# Patient Record
Sex: FEMALE | Race: WHITE | NOT HISPANIC OR LATINO | Employment: UNEMPLOYED | URBAN - METROPOLITAN AREA
[De-identification: names, ages, dates, MRNs, and addresses within clinical notes are randomized per-mention and may not be internally consistent; named-entity substitution may affect disease eponyms.]

---

## 2022-05-13 ENCOUNTER — APPOINTMENT (OUTPATIENT)
Dept: CT IMAGING | Facility: HOSPITAL | Age: 57
End: 2022-05-13

## 2022-05-13 ENCOUNTER — HOSPITAL ENCOUNTER (INPATIENT)
Facility: HOSPITAL | Age: 57
LOS: 4 days | Discharge: LEFT BEFORE DISCHARGE | End: 2022-05-17
Attending: EMERGENCY MEDICINE | Admitting: INTERNAL MEDICINE

## 2022-05-13 DIAGNOSIS — R17 JAUNDICE: Primary | ICD-10-CM

## 2022-05-13 DIAGNOSIS — E80.6 HYPERBILIRUBINEMIA: ICD-10-CM

## 2022-05-13 DIAGNOSIS — K75.9 HEPATITIS: ICD-10-CM

## 2022-05-13 DIAGNOSIS — R10.11 RIGHT UPPER QUADRANT PAIN: ICD-10-CM

## 2022-05-13 DIAGNOSIS — R74.8 ELEVATED LIVER ENZYMES: ICD-10-CM

## 2022-05-13 PROBLEM — Z72.0 TOBACCO ABUSE: Status: ACTIVE | Noted: 2022-05-13

## 2022-05-13 PROBLEM — F15.10 AMPHETAMINE ABUSE: Status: ACTIVE | Noted: 2022-05-13

## 2022-05-13 LAB
ALBUMIN SERPL-MCNC: 3.4 G/DL (ref 3.5–5.2)
ALBUMIN/GLOB SERPL: 1 G/DL
ALP SERPL-CCNC: 289 U/L (ref 39–117)
ALT SERPL W P-5'-P-CCNC: 1922 U/L (ref 1–33)
AMMONIA BLD-SCNC: 18 UMOL/L (ref 11–51)
AMPHET+METHAMPHET UR QL: POSITIVE
AMYLASE SERPL-CCNC: 87 U/L (ref 28–100)
ANION GAP SERPL CALCULATED.3IONS-SCNC: 9 MMOL/L (ref 5–15)
APAP SERPL-MCNC: <5 MCG/ML (ref 0–30)
APTT PPP: 36.4 SECONDS (ref 61–76.5)
AST SERPL-CCNC: 1901 U/L (ref 1–32)
BACTERIA UR QL AUTO: ABNORMAL /HPF
BARBITURATES UR QL SCN: NEGATIVE
BASOPHILS # BLD AUTO: 0 10*3/MM3 (ref 0–0.2)
BASOPHILS NFR BLD AUTO: 0.4 % (ref 0–1.5)
BENZODIAZ UR QL SCN: NEGATIVE
BILIRUB CONJ SERPL-MCNC: >10 MG/DL (ref 0–0.3)
BILIRUB SERPL-MCNC: 14.6 MG/DL (ref 0–1.2)
BILIRUB UR QL STRIP: ABNORMAL
BUN SERPL-MCNC: 10 MG/DL (ref 6–20)
BUN/CREAT SERPL: 16.4 (ref 7–25)
CALCIUM SPEC-SCNC: 9 MG/DL (ref 8.6–10.5)
CANNABINOIDS SERPL QL: NEGATIVE
CHLORIDE SERPL-SCNC: 102 MMOL/L (ref 98–107)
CLARITY UR: CLEAR
CO2 SERPL-SCNC: 26 MMOL/L (ref 22–29)
COCAINE UR QL: NEGATIVE
COLOR UR: ABNORMAL
CREAT SERPL-MCNC: 0.61 MG/DL (ref 0.57–1)
DEPRECATED RDW RBC AUTO: 53.4 FL (ref 37–54)
EGFRCR SERPLBLD CKD-EPI 2021: 103.8 ML/MIN/1.73
EOSINOPHIL # BLD AUTO: 0.1 10*3/MM3 (ref 0–0.4)
EOSINOPHIL NFR BLD AUTO: 2.4 % (ref 0.3–6.2)
ERYTHROCYTE [DISTWIDTH] IN BLOOD BY AUTOMATED COUNT: 17 % (ref 12.3–15.4)
ETHANOL UR QL: <0.01 %
GLOBULIN UR ELPH-MCNC: 3.5 GM/DL
GLUCOSE SERPL-MCNC: 99 MG/DL (ref 65–99)
GLUCOSE UR STRIP-MCNC: NEGATIVE MG/DL
HCT VFR BLD AUTO: 38.7 % (ref 34–46.6)
HETEROPH AB SER QL LA: NEGATIVE
HGB BLD-MCNC: 12.8 G/DL (ref 12–15.9)
HGB UR QL STRIP.AUTO: ABNORMAL
HYALINE CASTS UR QL AUTO: ABNORMAL /LPF
INR PPP: 1.48 (ref 0.93–1.1)
KETONES UR QL STRIP: NEGATIVE
LEUKOCYTE ESTERASE UR QL STRIP.AUTO: NEGATIVE
LIPASE SERPL-CCNC: 29 U/L (ref 13–60)
LYMPHOCYTES # BLD AUTO: 1.6 10*3/MM3 (ref 0.7–3.1)
LYMPHOCYTES NFR BLD AUTO: 26.7 % (ref 19.6–45.3)
MAGNESIUM SERPL-MCNC: 1.7 MG/DL (ref 1.6–2.6)
MCH RBC QN AUTO: 29.4 PG (ref 26.6–33)
MCHC RBC AUTO-ENTMCNC: 33 G/DL (ref 31.5–35.7)
MCV RBC AUTO: 89.2 FL (ref 79–97)
METHADONE UR QL SCN: NEGATIVE
MONOCYTES # BLD AUTO: 1 10*3/MM3 (ref 0.1–0.9)
MONOCYTES NFR BLD AUTO: 16.7 % (ref 5–12)
NEUTROPHILS NFR BLD AUTO: 3.2 10*3/MM3 (ref 1.7–7)
NEUTROPHILS NFR BLD AUTO: 53.8 % (ref 42.7–76)
NITRITE UR QL STRIP: NEGATIVE
NRBC BLD AUTO-RTO: 0.2 /100 WBC (ref 0–0.2)
OPIATES UR QL: NEGATIVE
OXYCODONE UR QL SCN: NEGATIVE
PH UR STRIP.AUTO: 6 [PH] (ref 5–8)
PLATELET # BLD AUTO: 308 10*3/MM3 (ref 140–450)
PMV BLD AUTO: 9.7 FL (ref 6–12)
POTASSIUM SERPL-SCNC: 4.1 MMOL/L (ref 3.5–5.2)
PROT SERPL-MCNC: 6.9 G/DL (ref 6–8.5)
PROT UR QL STRIP: NEGATIVE
PROTHROMBIN TIME: 14.9 SECONDS (ref 9.6–11.7)
RBC # BLD AUTO: 4.33 10*6/MM3 (ref 3.77–5.28)
RBC # UR STRIP: ABNORMAL /HPF
REF LAB TEST METHOD: ABNORMAL
SARS-COV-2 RNA PNL SPEC NAA+PROBE: NOT DETECTED
SODIUM SERPL-SCNC: 137 MMOL/L (ref 136–145)
SP GR UR STRIP: 1.01 (ref 1–1.03)
SQUAMOUS #/AREA URNS HPF: ABNORMAL /HPF
UROBILINOGEN UR QL STRIP: ABNORMAL
WBC # UR STRIP: ABNORMAL /HPF
WBC NRBC COR # BLD: 5.9 10*3/MM3 (ref 3.4–10.8)

## 2022-05-13 PROCEDURE — 80307 DRUG TEST PRSMV CHEM ANLYZR: CPT

## 2022-05-13 PROCEDURE — 85025 COMPLETE CBC W/AUTO DIFF WBC: CPT

## 2022-05-13 PROCEDURE — 82140 ASSAY OF AMMONIA: CPT

## 2022-05-13 PROCEDURE — 87635 SARS-COV-2 COVID-19 AMP PRB: CPT | Performed by: EMERGENCY MEDICINE

## 2022-05-13 PROCEDURE — 25010000002 ENOXAPARIN PER 10 MG: Performed by: INTERNAL MEDICINE

## 2022-05-13 PROCEDURE — 82077 ASSAY SPEC XCP UR&BREATH IA: CPT

## 2022-05-13 PROCEDURE — 25010000002 HYDROMORPHONE 1 MG/ML SOLUTION: Performed by: INTERNAL MEDICINE

## 2022-05-13 PROCEDURE — 80143 DRUG ASSAY ACETAMINOPHEN: CPT

## 2022-05-13 PROCEDURE — 85730 THROMBOPLASTIN TIME PARTIAL: CPT

## 2022-05-13 PROCEDURE — 99284 EMERGENCY DEPT VISIT MOD MDM: CPT

## 2022-05-13 PROCEDURE — 0 IOPAMIDOL PER 1 ML: Performed by: EMERGENCY MEDICINE

## 2022-05-13 PROCEDURE — 82150 ASSAY OF AMYLASE: CPT

## 2022-05-13 PROCEDURE — 83735 ASSAY OF MAGNESIUM: CPT

## 2022-05-13 PROCEDURE — 82248 BILIRUBIN DIRECT: CPT

## 2022-05-13 PROCEDURE — 81001 URINALYSIS AUTO W/SCOPE: CPT

## 2022-05-13 PROCEDURE — 85610 PROTHROMBIN TIME: CPT

## 2022-05-13 PROCEDURE — 99222 1ST HOSP IP/OBS MODERATE 55: CPT | Performed by: INTERNAL MEDICINE

## 2022-05-13 PROCEDURE — 74177 CT ABD & PELVIS W/CONTRAST: CPT

## 2022-05-13 PROCEDURE — 25010000002 MAGNESIUM SULFATE 2 GM/50ML SOLUTION: Performed by: INTERNAL MEDICINE

## 2022-05-13 PROCEDURE — 80053 COMPREHEN METABOLIC PANEL: CPT

## 2022-05-13 PROCEDURE — 86308 HETEROPHILE ANTIBODY SCREEN: CPT | Performed by: INTERNAL MEDICINE

## 2022-05-13 PROCEDURE — 83690 ASSAY OF LIPASE: CPT

## 2022-05-13 RX ORDER — SODIUM CHLORIDE 0.9 % (FLUSH) 0.9 %
10 SYRINGE (ML) INJECTION AS NEEDED
Status: DISCONTINUED | OUTPATIENT
Start: 2022-05-13 | End: 2022-05-17 | Stop reason: HOSPADM

## 2022-05-13 RX ORDER — NICOTINE 21 MG/24HR
1 PATCH, TRANSDERMAL 24 HOURS TRANSDERMAL EVERY 24 HOURS
Status: DISCONTINUED | OUTPATIENT
Start: 2022-05-13 | End: 2022-05-17 | Stop reason: HOSPADM

## 2022-05-13 RX ORDER — CHOLECALCIFEROL (VITAMIN D3) 125 MCG
5 CAPSULE ORAL NIGHTLY PRN
Status: DISCONTINUED | OUTPATIENT
Start: 2022-05-13 | End: 2022-05-17 | Stop reason: HOSPADM

## 2022-05-13 RX ORDER — MAGNESIUM SULFATE HEPTAHYDRATE 40 MG/ML
2 INJECTION, SOLUTION INTRAVENOUS ONCE
Status: COMPLETED | OUTPATIENT
Start: 2022-05-13 | End: 2022-05-13

## 2022-05-13 RX ORDER — ALUMINA, MAGNESIA, AND SIMETHICONE 2400; 2400; 240 MG/30ML; MG/30ML; MG/30ML
15 SUSPENSION ORAL EVERY 6 HOURS PRN
Status: DISCONTINUED | OUTPATIENT
Start: 2022-05-13 | End: 2022-05-17 | Stop reason: HOSPADM

## 2022-05-13 RX ORDER — SODIUM CHLORIDE 0.9 % (FLUSH) 0.9 %
10 SYRINGE (ML) INJECTION EVERY 12 HOURS SCHEDULED
Status: DISCONTINUED | OUTPATIENT
Start: 2022-05-13 | End: 2022-05-17 | Stop reason: HOSPADM

## 2022-05-13 RX ORDER — ACETAMINOPHEN 500 MG
500 TABLET ORAL EVERY 6 HOURS PRN
Status: DISCONTINUED | OUTPATIENT
Start: 2022-05-13 | End: 2022-05-17 | Stop reason: HOSPADM

## 2022-05-13 RX ORDER — ONDANSETRON 2 MG/ML
4 INJECTION INTRAMUSCULAR; INTRAVENOUS EVERY 6 HOURS PRN
Status: DISCONTINUED | OUTPATIENT
Start: 2022-05-13 | End: 2022-05-17 | Stop reason: HOSPADM

## 2022-05-13 RX ORDER — TRAZODONE HYDROCHLORIDE 50 MG/1
50 TABLET ORAL NIGHTLY
Status: DISCONTINUED | OUTPATIENT
Start: 2022-05-13 | End: 2022-05-17 | Stop reason: HOSPADM

## 2022-05-13 RX ORDER — ACETAMINOPHEN 160 MG/5ML
500 SOLUTION ORAL EVERY 6 HOURS PRN
Status: DISCONTINUED | OUTPATIENT
Start: 2022-05-13 | End: 2022-05-17 | Stop reason: HOSPADM

## 2022-05-13 RX ORDER — SODIUM CHLORIDE, SODIUM LACTATE, POTASSIUM CHLORIDE, CALCIUM CHLORIDE 600; 310; 30; 20 MG/100ML; MG/100ML; MG/100ML; MG/100ML
100 INJECTION, SOLUTION INTRAVENOUS CONTINUOUS
Status: DISCONTINUED | OUTPATIENT
Start: 2022-05-13 | End: 2022-05-17 | Stop reason: HOSPADM

## 2022-05-13 RX ORDER — PANTOPRAZOLE SODIUM 40 MG/10ML
40 INJECTION, POWDER, LYOPHILIZED, FOR SOLUTION INTRAVENOUS
Status: DISCONTINUED | OUTPATIENT
Start: 2022-05-14 | End: 2022-05-15

## 2022-05-13 RX ORDER — IPRATROPIUM BROMIDE AND ALBUTEROL SULFATE 2.5; .5 MG/3ML; MG/3ML
3 SOLUTION RESPIRATORY (INHALATION) EVERY 6 HOURS PRN
Status: DISCONTINUED | OUTPATIENT
Start: 2022-05-13 | End: 2022-05-17 | Stop reason: HOSPADM

## 2022-05-13 RX ORDER — NITROGLYCERIN 0.4 MG/1
0.4 TABLET SUBLINGUAL
Status: DISCONTINUED | OUTPATIENT
Start: 2022-05-13 | End: 2022-05-17 | Stop reason: HOSPADM

## 2022-05-13 RX ORDER — ACETAMINOPHEN 650 MG/1
325 SUPPOSITORY RECTAL EVERY 6 HOURS PRN
Status: DISCONTINUED | OUTPATIENT
Start: 2022-05-13 | End: 2022-05-17 | Stop reason: HOSPADM

## 2022-05-13 RX ORDER — ONDANSETRON 4 MG/1
4 TABLET, FILM COATED ORAL EVERY 6 HOURS PRN
Status: DISCONTINUED | OUTPATIENT
Start: 2022-05-13 | End: 2022-05-17 | Stop reason: HOSPADM

## 2022-05-13 RX ORDER — ENOXAPARIN SODIUM 100 MG/ML
40 INJECTION SUBCUTANEOUS
Status: DISCONTINUED | OUTPATIENT
Start: 2022-05-13 | End: 2022-05-14

## 2022-05-13 RX ADMIN — SODIUM CHLORIDE, POTASSIUM CHLORIDE, SODIUM LACTATE AND CALCIUM CHLORIDE 100 ML/HR: 600; 310; 30; 20 INJECTION, SOLUTION INTRAVENOUS at 23:52

## 2022-05-13 RX ADMIN — HYDROMORPHONE HYDROCHLORIDE 0.5 MG: 1 INJECTION, SOLUTION INTRAMUSCULAR; INTRAVENOUS; SUBCUTANEOUS at 22:45

## 2022-05-13 RX ADMIN — Medication 10 ML: at 15:37

## 2022-05-13 RX ADMIN — MAGNESIUM SULFATE HEPTAHYDRATE 2 G: 2 INJECTION, SOLUTION INTRAVENOUS at 22:34

## 2022-05-13 RX ADMIN — TRAZODONE HYDROCHLORIDE 50 MG: 50 TABLET ORAL at 22:44

## 2022-05-13 RX ADMIN — ENOXAPARIN SODIUM 40 MG: 100 INJECTION SUBCUTANEOUS at 22:45

## 2022-05-13 RX ADMIN — SODIUM CHLORIDE 1000 ML: 9 INJECTION, SOLUTION INTRAVENOUS at 15:36

## 2022-05-13 RX ADMIN — NICOTINE 1 PATCH: 14 PATCH, EXTENDED RELEASE TRANSDERMAL at 22:44

## 2022-05-13 RX ADMIN — IOPAMIDOL 100 ML: 755 INJECTION, SOLUTION INTRAVENOUS at 17:33

## 2022-05-13 NOTE — ED PROVIDER NOTES
Subjective   Chief Complaint: Jaundice      HPI: Patient is a 56-year-old female presents to the ER today for yellowing skin and eyes and intermittent right upper quadrant pain.  She denies that she has a history of alcoholism, does states she may drink 1-2 daiquiri's once a week. she does report she has used meth in the last 3 weeks.  She states that she noticed her symptoms approximately a week ago and they have progressively gotten worse.  She reports an increased anxiety and stress over the last several months as her mother and father passed away recently.  She has had several abdominal surgeries including ruptured appendix when she was 15 that required bowel repair.    PCP: None          Review of Systems   Constitutional: Positive for fatigue. Negative for chills and fever.   HENT: Negative for sore throat and trouble swallowing.         Mouth swelling   Eyes: Negative for photophobia and visual disturbance.   Respiratory: Negative for cough and shortness of breath.    Cardiovascular: Negative for chest pain and palpitations.   Gastrointestinal: Positive for abdominal pain. Negative for blood in stool, constipation, diarrhea, nausea and vomiting.   Endocrine: Negative for polyuria.   Genitourinary: Negative for dysuria, flank pain and hematuria.   Musculoskeletal: Negative.    Skin: Positive for color change.   Neurological: Negative for dizziness, light-headedness and headaches.   Hematological: Negative.    Psychiatric/Behavioral: Negative.        Past Medical History:   Diagnosis Date   • Anemia    • Arthritis    • Asthma    • Depression    • History of transfusion    • Kidney stone    • Pneumonia        No Known Allergies    Past Surgical History:   Procedure Laterality Date   • ABDOMINAL SURGERY N/A     Appendectomy, bilateral salpingectomy, bowel resection   • APPENDECTOMY         History reviewed. No pertinent family history.    Social History     Socioeconomic History   • Marital status: Single    Tobacco Use   • Smoking status: Current Every Day Smoker     Packs/day: 1.00     Years: 40.00     Pack years: 40.00     Types: Cigarettes   Vaping Use   • Vaping Use: Never used   Substance and Sexual Activity   • Alcohol use: Not Currently   • Drug use: Not Currently     Types: IV, Cocaine(coke), Marijuana, Methamphetamines     Comment: Marijuana use last 3 weeks ago   • Sexual activity: Yes     Partners: Male     Birth control/protection: None           Objective   Physical Exam  Vitals reviewed.   Constitutional:       Appearance: She is not ill-appearing or toxic-appearing.   HENT:      Head: Normocephalic.      Mouth/Throat:      Mouth: Mucous membranes are moist.      Pharynx: Oropharynx is clear.   Eyes:      General: Lids are normal.      Extraocular Movements: Extraocular movements intact.      Pupils: Pupils are equal, round, and reactive to light.      Comments: Scleral icterus   Cardiovascular:      Rate and Rhythm: Normal rate and regular rhythm.      Pulses: Normal pulses.      Heart sounds: Normal heart sounds. No murmur heard.  Pulmonary:      Effort: Pulmonary effort is normal.      Breath sounds: Normal breath sounds. No wheezing.   Abdominal:      General: Bowel sounds are normal. There is no distension.      Palpations: Abdomen is soft. There is hepatomegaly.      Tenderness: There is no abdominal tenderness.   Musculoskeletal:         General: No tenderness. Normal range of motion.      Cervical back: Normal range of motion.   Skin:     General: Skin is warm and dry.      Coloration: Skin is jaundiced.      Findings: No bruising.   Neurological:      General: No focal deficit present.      Mental Status: She is alert and oriented to person, place, and time. Mental status is at baseline.      Motor: No weakness.   Psychiatric:         Attention and Perception: Attention normal.         Mood and Affect: Mood is anxious.         Speech: Speech normal.         Behavior: Behavior is cooperative.     "     Thought Content: Thought content normal.         Cognition and Memory: Cognition normal.         Judgment: Judgment normal.         Procedures           ED Course  ED Course as of 05/13/22 2021   Fri May 13, 2022   1453 Patient evaluated after being placed in a room from triage []   1631 CT Abdomen Pelvis With Contrast  Pending testing  [BH]   1722 CT Abdomen Pelvis With Contrast  Pending testing.  [BH]   1722 Bilirubin, UA(!): Moderate (2+) [BH]   1723 Bilirubin, Direct(!): >10.0 [BH]   1723 ALT (SGPT)(!): 1,922 [BH]   1723 AST (SGOT)(!): 1,901 [BH]   1723 Alkaline Phosphatase(!): 289 [BH]   1723 Total Bilirubin(!): 14.6 [BH]   1723 Albumin(!): 3.40 [BH]   1737 Amphet/Methamphet, Screen(!): Positive  Patient admitted to meth abuse approximately 3 weeks ago.  []   1740 CT Abdomen Pelvis With Contrast  Pending results []   1832 CT Abdomen Pelvis With Contrast  Pending results   []   1918 Patient reports last intake was approximately 1 hour ago with sips of a drink.  She dorsey not had a meal today.  []   1956 Spoke with Dr. Bautista with gastroenterology who advised that patient would need an TAD as well as a hepatic profile.  He requested that a PT/INR and CMP be completed in the morning as well as IV fluids.  Dr. Ramey was notified. []      ED Course User Index  [] Jenna Harvey, BLAINE           /75 (BP Location: Left arm, Patient Position: Lying)   Pulse 84   Temp 97.8 °F (36.6 °C) (Oral)   Resp 20   Ht 157.5 cm (62\")   Wt 63.1 kg (139 lb 1.8 oz)   SpO2 99%   BMI 25.44 kg/m²   Labs Reviewed   COMPREHENSIVE METABOLIC PANEL - Abnormal; Notable for the following components:       Result Value    Albumin 3.40 (*)     ALT (SGPT) 1,922 (*)     AST (SGOT) 1,901 (*)     Alkaline Phosphatase 289 (*)     Total Bilirubin 14.6 (*)     All other components within normal limits    Narrative:     GFR Normal >60  Chronic Kidney Disease <60  Kidney Failure <15     PROTIME-INR - Abnormal; Notable " for the following components:    Protime 14.9 (*)     INR 1.48 (*)     All other components within normal limits   APTT - Abnormal; Notable for the following components:    PTT 36.4 (*)     All other components within normal limits   URINALYSIS W/ MICROSCOPIC IF INDICATED (NO CULTURE) - Abnormal; Notable for the following components:    Color, UA Dark Yellow (*)     Bilirubin, UA Moderate (2+) (*)     Blood, UA Small (1+) (*)     All other components within normal limits   URINE DRUG SCREEN - Abnormal; Notable for the following components:    Amphet/Methamphet, Screen Positive (*)     All other components within normal limits    Narrative:     Negative Thresholds Per Drugs Screened:    Amphetamines                 500 ng/ml  Barbiturates                 200 ng/ml  Benzodiazepines              100 ng/ml  Cocaine                      300 ng/ml  Methadone                    300 ng/ml  Opiates                      300 ng/ml  Oxycodone                    100 ng/ml  THC                           50 ng/ml    The Normal Value for all drugs tested is negative. This report includes final unconfirmed screening results to be used for medical treatment purposes only. Unconfirmed results must not be used for non-medical purposes such as employment or legal testing. Clinical consideration should be applied to any drug of abuse test, particularly when unconfirmed results are used.          All urine drugs of abuse requests without chain of custody are for medical screening purposes only.  False positives are possible.     CBC WITH AUTO DIFFERENTIAL - Abnormal; Notable for the following components:    RDW 17.0 (*)     Monocyte % 16.7 (*)     Monocytes, Absolute 1.00 (*)     All other components within normal limits   BILIRUBIN, DIRECT - Abnormal; Notable for the following components:    Bilirubin, Direct >10.0 (*)     All other components within normal limits   URINALYSIS, MICROSCOPIC ONLY - Abnormal; Notable for the following  components:    RBC, UA 0-2 (*)     WBC, UA 0-2 (*)     All other components within normal limits   LIPASE - Normal   AMYLASE - Normal   AMMONIA - Normal   ACETAMINOPHEN LEVEL - Normal    Narrative:     Acetaminophen Therapeutic Range  5-20 ug/mL      Hours after ingestion            Toxic Value    4 Hours                           150 ug/mL    8 Hours                            70 ug/mL   12 Hours                            40 ug/mL   16 Hours                            20 ug/mL    These values apply to a single ingestion only.    MAGNESIUM - Normal   COVID PRE-OP / PRE-PROCEDURE SCREENING ORDER (NO ISOLATION)    Narrative:     The following orders were created for panel order COVID PRE-OP / PRE-PROCEDURE SCREENING ORDER (NO ISOLATION) - Swab, Nasopharynx.  Procedure                               Abnormality         Status                     ---------                               -----------         ------                     COVID-19,CEPHEID/GUTIERREZ,CO...[[id number]]                      In process                   Please view results for these tests on the individual orders.   COVID-19,CEPHEID/GUTIERREZ,COR/KARISHMA/PAD/NILA IN-HOUSE,NP SWAB IN TRANSPORT MEDIA 3-4 HR TAT, RT-PCR   ETHANOL    Narrative:     Plasma Ethanol Clinical Symptoms:    ETOH (%)               Clinical Symptom  .01-.05              No apparent influence  .03-.12              Euphoria, Diminished judgment and attention   .09-.25              Impaired comprehension, Muscle incoordination  .18-.30              Confusion, Staggered gait, Slurred speech  .25-.40              Markedly decreased response to stimuli, unable to stand or                        walk, vomitting, sleep or stupor  .35-.50              Comatose, Anesthesia, Subnormal body temperature       TAD   HEPATIC FUNCTION PANEL   HEPATITIS PANEL, ACUTE   HIV-1 AND HIV-2 ANTIBODIES    Narrative:     The following orders were created for panel order HIV-1 & HIV-2 Antibodies.  Procedure                                Abnormality         Status                     ---------                               -----------         ------                     HIV-1 / O / 2 Ag / Antib...[[id number]]                                                   Please view results for these tests on the individual orders.   MONONUCLEOSIS SCREEN   HIV-1/O/2 ANTIGEN/ANTIBODY, 4TH GENERATION   CBC AND DIFFERENTIAL    Narrative:     The following orders were created for panel order CBC & Differential.  Procedure                               Abnormality         Status                     ---------                               -----------         ------                     CBC Auto Differential[[id number]]        Abnormal            Final result                 Please view results for these tests on the individual orders.     Medications   sodium chloride 0.9 % flush 10 mL (10 mL Intravenous Given 5/13/22 3397)   nitroglycerin (NITROSTAT) SL tablet 0.4 mg (has no administration in time range)   sodium chloride 0.9 % flush 10 mL (has no administration in time range)   sodium chloride 0.9 % flush 10 mL (has no administration in time range)   acetaminophen (TYLENOL) tablet 650 mg (has no administration in time range)     Or   acetaminophen (TYLENOL) 160 MG/5ML solution 650 mg (has no administration in time range)     Or   acetaminophen (TYLENOL) suppository 650 mg (has no administration in time range)   aluminum-magnesium hydroxide-simethicone (MAALOX MAX) 400-400-40 MG/5ML suspension 15 mL (has no administration in time range)   ondansetron (ZOFRAN) tablet 4 mg (has no administration in time range)     Or   ondansetron (ZOFRAN) injection 4 mg (has no administration in time range)   melatonin tablet 5 mg (has no administration in time range)   Enoxaparin Sodium (LOVENOX) syringe 40 mg (has no administration in time range)   sodium chloride 0.9 % bolus 1,000 mL (0 mL Intravenous Stopped 5/13/22 1495)   iopamidol (ISOVUE-370) 76 % injection 100  mL (100 mL Intravenous Given 5/13/22 1733)     CT Abdomen Pelvis With Contrast    Result Date: 5/13/2022  1. Cholelithiasis with dilated gallbladder which may relate to cholecystitis, correlate with associated clinical findings. 2. Common bile duct dilated up to 7 mm with suspicion of a small amount of debris in the proximal CBD concerning for choledocholithiasis. This could be confirmed with MRCP. 3. Prominent dilated pelvic venous vessels asymmetric to the left abutting the uterus which are nonspecific but can be seen with pelvic venous congestion. 4. Small left pleural effusion.     Electronically Signed By-Flo Sylvester MD On:5/13/2022 6:36 PM This report was finalized on [id number] by  Flo Sylvester MD.                                          MDM  Number of Diagnoses or Management Options  Elevated liver enzymes  Jaundice  Right upper quadrant pain  Diagnosis management comments: While in the emergency room an IV was established and labs were obtained.  Patient was given an IV fluid bolus.  She was without pain.  Labs were indicative of elevated liver enzymes, ALT 1922, AST 1901, alk phos was 289, total bilirubin was 14.6 and direct bili was greater than 10.  Patient's white count 5.9.  A call was placed to Dr. Pickering with general surgery who advised that patient would need a GI consult for possible MRCP, but advised that she may need a cholecystectomy following.  Spoke with Dr. Bautista with GI who suggested that an TAD and a hepatic profile be added to the patient's labs, these were added and are pending at time of admission..  Spoke with Dr. Ramey with the hospitalist group who agreed to admission, admitting physician Dr. Juárez.      CBC, CMP and PT and INR orders were placed for a.m. draw.    Chart review: No recent visits that are pertinent to today's complaint    Comorbidities: Unknown patient reports she does not have a primary care    Differentials: Cholecystitis, cirrhosis, liver dysfunction,  biliary obstruction not all inclusive of differentials considered    Appropriate PPE worn throughout the care of this patient.           Amount and/or Complexity of Data Reviewed  Clinical lab tests: reviewed  Tests in the radiology section of CPT®: reviewed    Patient Progress  Patient progress: stable      Final diagnoses:   Jaundice   Right upper quadrant pain   Elevated liver enzymes       ED Disposition  ED Disposition     ED Disposition   Decision to Admit    Condition   --    Comment   Level of Care: Med/Surg [1]   Admitting Physician: SKYLAR WHITE [[id number]]   Attending Physician: SKYLAR WHITE [[id number]]               No follow-up provider specified.       Medication List      No changes were made to your prescriptions during this visit.          Jenna Harvey, APRN  05/13/22 2022

## 2022-05-14 ENCOUNTER — APPOINTMENT (OUTPATIENT)
Dept: MRI IMAGING | Facility: HOSPITAL | Age: 57
End: 2022-05-14

## 2022-05-14 ENCOUNTER — INPATIENT HOSPITAL (OUTPATIENT)
Dept: URBAN - METROPOLITAN AREA HOSPITAL 84 | Facility: HOSPITAL | Age: 57
End: 2022-05-14

## 2022-05-14 DIAGNOSIS — R17 UNSPECIFIED JAUNDICE: ICD-10-CM

## 2022-05-14 DIAGNOSIS — R53.83 OTHER FATIGUE: ICD-10-CM

## 2022-05-14 DIAGNOSIS — K75.9 INFLAMMATORY LIVER DISEASE, UNSPECIFIED: ICD-10-CM

## 2022-05-14 DIAGNOSIS — F15.10 OTHER STIMULANT ABUSE, UNCOMPLICATED: ICD-10-CM

## 2022-05-14 DIAGNOSIS — R11.0 NAUSEA: ICD-10-CM

## 2022-05-14 DIAGNOSIS — R10.9 UNSPECIFIED ABDOMINAL PAIN: ICD-10-CM

## 2022-05-14 LAB
ALBUMIN SERPL-MCNC: 3.1 G/DL (ref 3.5–5.2)
ALBUMIN/GLOB SERPL: 0.9 G/DL
ALP SERPL-CCNC: 272 U/L (ref 39–117)
ALT SERPL W P-5'-P-CCNC: 2097 U/L (ref 1–33)
ANION GAP SERPL CALCULATED.3IONS-SCNC: 12 MMOL/L (ref 5–15)
AST SERPL-CCNC: 2095 U/L (ref 1–32)
BASOPHILS # BLD AUTO: 0.1 10*3/MM3 (ref 0–0.2)
BASOPHILS NFR BLD AUTO: 1.1 % (ref 0–1.5)
BILIRUB CONJ SERPL-MCNC: >10 MG/DL (ref 0–0.3)
BILIRUB SERPL-MCNC: 14.3 MG/DL (ref 0–1.2)
BUN SERPL-MCNC: 9 MG/DL (ref 6–20)
BUN/CREAT SERPL: 16.1 (ref 7–25)
CALCIUM SPEC-SCNC: 9.2 MG/DL (ref 8.6–10.5)
CERULOPLASMIN SERPL-MCNC: 35 MG/DL (ref 19–39)
CHLORIDE SERPL-SCNC: 100 MMOL/L (ref 98–107)
CO2 SERPL-SCNC: 23 MMOL/L (ref 22–29)
CREAT SERPL-MCNC: 0.56 MG/DL (ref 0.57–1)
DEPRECATED RDW RBC AUTO: 52.9 FL (ref 37–54)
EGFRCR SERPLBLD CKD-EPI 2021: 105.9 ML/MIN/1.73
EOSINOPHIL # BLD AUTO: 0.1 10*3/MM3 (ref 0–0.4)
EOSINOPHIL NFR BLD AUTO: 1.4 % (ref 0.3–6.2)
ERYTHROCYTE [DISTWIDTH] IN BLOOD BY AUTOMATED COUNT: 16.7 % (ref 12.3–15.4)
GLOBULIN UR ELPH-MCNC: 3.4 GM/DL
GLUCOSE SERPL-MCNC: 84 MG/DL (ref 65–99)
HAV IGM SERPL QL IA: ABNORMAL
HBV CORE IGM SERPL QL IA: REACTIVE
HBV SURFACE AG SERPL QL IA: ABNORMAL
HCT VFR BLD AUTO: 35.4 % (ref 34–46.6)
HCV AB SER DONR QL: REACTIVE
HGB BLD-MCNC: 11.8 G/DL (ref 12–15.9)
HIV1+2 AB SER QL: NORMAL
INR PPP: 1.51 (ref 0.93–1.1)
LYMPHOCYTES # BLD AUTO: 1.6 10*3/MM3 (ref 0.7–3.1)
LYMPHOCYTES NFR BLD AUTO: 26.6 % (ref 19.6–45.3)
MAGNESIUM SERPL-MCNC: 1.8 MG/DL (ref 1.6–2.6)
MCH RBC QN AUTO: 29.9 PG (ref 26.6–33)
MCHC RBC AUTO-ENTMCNC: 33.3 G/DL (ref 31.5–35.7)
MCV RBC AUTO: 89.9 FL (ref 79–97)
MONOCYTES # BLD AUTO: 0.8 10*3/MM3 (ref 0.1–0.9)
MONOCYTES NFR BLD AUTO: 13.2 % (ref 5–12)
NEUTROPHILS NFR BLD AUTO: 3.5 10*3/MM3 (ref 1.7–7)
NEUTROPHILS NFR BLD AUTO: 57.7 % (ref 42.7–76)
NRBC BLD AUTO-RTO: 0.2 /100 WBC (ref 0–0.2)
PLATELET # BLD AUTO: 272 10*3/MM3 (ref 140–450)
PMV BLD AUTO: 10.3 FL (ref 6–12)
POTASSIUM SERPL-SCNC: 3.9 MMOL/L (ref 3.5–5.2)
PROT SERPL-MCNC: 6.5 G/DL (ref 6–8.5)
PROTHROMBIN TIME: 15.2 SECONDS (ref 9.6–11.7)
RBC # BLD AUTO: 3.94 10*6/MM3 (ref 3.77–5.28)
SODIUM SERPL-SCNC: 135 MMOL/L (ref 136–145)
WBC NRBC COR # BLD: 6.1 10*3/MM3 (ref 3.4–10.8)

## 2022-05-14 PROCEDURE — 86038 ANTINUCLEAR ANTIBODIES: CPT

## 2022-05-14 PROCEDURE — 85610 PROTHROMBIN TIME: CPT

## 2022-05-14 PROCEDURE — 86663 EPSTEIN-BARR ANTIBODY: CPT | Performed by: NURSE PRACTITIONER

## 2022-05-14 PROCEDURE — A9579 GAD-BASE MR CONTRAST NOS,1ML: HCPCS | Performed by: INTERNAL MEDICINE

## 2022-05-14 PROCEDURE — 80074 ACUTE HEPATITIS PANEL: CPT | Performed by: INTERNAL MEDICINE

## 2022-05-14 PROCEDURE — 74183 MRI ABD W/O CNTR FLWD CNTR: CPT

## 2022-05-14 PROCEDURE — 87517 HEPATITIS B DNA QUANT: CPT | Performed by: NURSE PRACTITIONER

## 2022-05-14 PROCEDURE — 25010000002 PIPERACILLIN SOD-TAZOBACTAM PER 1 G: Performed by: NURSE PRACTITIONER

## 2022-05-14 PROCEDURE — 86665 EPSTEIN-BARR CAPSID VCA: CPT | Performed by: NURSE PRACTITIONER

## 2022-05-14 PROCEDURE — 86381 MITOCHONDRIAL ANTIBODY EACH: CPT | Performed by: INTERNAL MEDICINE

## 2022-05-14 PROCEDURE — 86015 ACTIN ANTIBODY EACH: CPT | Performed by: INTERNAL MEDICINE

## 2022-05-14 PROCEDURE — 82787 IGG 1 2 3 OR 4 EACH: CPT | Performed by: INTERNAL MEDICINE

## 2022-05-14 PROCEDURE — 83735 ASSAY OF MAGNESIUM: CPT | Performed by: INTERNAL MEDICINE

## 2022-05-14 PROCEDURE — 99232 SBSQ HOSP IP/OBS MODERATE 35: CPT | Performed by: HOSPITALIST

## 2022-05-14 PROCEDURE — 25010000002 GADOTERIDOL PER 1 ML: Performed by: INTERNAL MEDICINE

## 2022-05-14 PROCEDURE — 99223 1ST HOSP IP/OBS HIGH 75: CPT | Performed by: SURGERY

## 2022-05-14 PROCEDURE — G0432 EIA HIV-1/HIV-2 SCREEN: HCPCS | Performed by: INTERNAL MEDICINE

## 2022-05-14 PROCEDURE — 85025 COMPLETE CBC W/AUTO DIFF WBC: CPT

## 2022-05-14 PROCEDURE — 86645 CMV ANTIBODY IGM: CPT | Performed by: NURSE PRACTITIONER

## 2022-05-14 PROCEDURE — 86664 EPSTEIN-BARR NUCLEAR ANTIGEN: CPT | Performed by: NURSE PRACTITIONER

## 2022-05-14 PROCEDURE — 80053 COMPREHEN METABOLIC PANEL: CPT

## 2022-05-14 PROCEDURE — 87522 HEPATITIS C REVRS TRNSCRPJ: CPT | Performed by: NURSE PRACTITIONER

## 2022-05-14 PROCEDURE — 36415 COLL VENOUS BLD VENIPUNCTURE: CPT | Performed by: INTERNAL MEDICINE

## 2022-05-14 PROCEDURE — 82390 ASSAY OF CERULOPLASMIN: CPT | Performed by: NURSE PRACTITIONER

## 2022-05-14 PROCEDURE — 82248 BILIRUBIN DIRECT: CPT

## 2022-05-14 PROCEDURE — 99223 1ST HOSP IP/OBS HIGH 75: CPT | Performed by: NURSE PRACTITIONER

## 2022-05-14 PROCEDURE — 87341 HEP B SURFACE AG NEUTRLZJ IA: CPT | Performed by: INTERNAL MEDICINE

## 2022-05-14 PROCEDURE — 86644 CMV ANTIBODY: CPT | Performed by: NURSE PRACTITIONER

## 2022-05-14 RX ORDER — METOCLOPRAMIDE HYDROCHLORIDE 5 MG/ML
10 INJECTION INTRAMUSCULAR; INTRAVENOUS EVERY 6 HOURS
Status: DISCONTINUED | OUTPATIENT
Start: 2022-05-14 | End: 2022-05-14

## 2022-05-14 RX ORDER — ZINC SULFATE 50(220)MG
220 CAPSULE ORAL DAILY
Status: DISCONTINUED | OUTPATIENT
Start: 2022-05-14 | End: 2022-05-17 | Stop reason: HOSPADM

## 2022-05-14 RX ORDER — POLYETHYLENE GLYCOL 3350 17 G/17G
17 POWDER, FOR SOLUTION ORAL DAILY
Status: DISCONTINUED | OUTPATIENT
Start: 2022-05-14 | End: 2022-05-14

## 2022-05-14 RX ADMIN — PIPERACILLIN AND TAZOBACTAM 3.38 G: 3; .375 INJECTION, POWDER, LYOPHILIZED, FOR SOLUTION INTRAVENOUS at 10:52

## 2022-05-14 RX ADMIN — Medication 10 ML: at 08:17

## 2022-05-14 RX ADMIN — SODIUM CHLORIDE, POTASSIUM CHLORIDE, SODIUM LACTATE AND CALCIUM CHLORIDE 100 ML/HR: 600; 310; 30; 20 INJECTION, SOLUTION INTRAVENOUS at 11:31

## 2022-05-14 RX ADMIN — GADOTERIDOL 20 ML: 279.3 INJECTION, SOLUTION INTRAVENOUS at 07:42

## 2022-05-14 RX ADMIN — Medication 100 MG: at 16:33

## 2022-05-14 RX ADMIN — PANTOPRAZOLE SODIUM 40 MG: 40 INJECTION, POWDER, FOR SOLUTION INTRAVENOUS at 05:53

## 2022-05-14 RX ADMIN — NICOTINE 1 PATCH: 14 PATCH, EXTENDED RELEASE TRANSDERMAL at 21:53

## 2022-05-14 RX ADMIN — PIPERACILLIN AND TAZOBACTAM 3.38 G: 3; .375 INJECTION, POWDER, LYOPHILIZED, FOR SOLUTION INTRAVENOUS at 16:33

## 2022-05-14 RX ADMIN — TRAZODONE HYDROCHLORIDE 50 MG: 50 TABLET ORAL at 21:51

## 2022-05-14 RX ADMIN — Medication 220 MG: at 16:33

## 2022-05-14 NOTE — PROGRESS NOTES
[facility] Daily Progress Note    Patient Name: Grace Birmingham  : 1965  MRN: [id number]  Primary Care Physician:  Provider, No Known  Date of admission: 2022      Subjective      Chief Complaint: Jaundice      Patient Reports     22: Claims 1 week history of fatigue, jaundice and diarrhea.  NPO.  GI consulted.  Denies excessive alcohol or Tylenol use.    Review of Systems   All other systems reviewed and are negative.         Objective      Vitals:   Temp:  [97.2 °F (36.2 °C)-97.8 °F (36.6 °C)] 97.2 °F (36.2 °C)  Heart Rate:  [70-90] 70  Resp:  [18-20] 18  BP: (116-154)/(72-97) 116/72    Physical Exam  HENT:      Head: Normocephalic.      Nose: Nose normal.   Eyes:      Extraocular Movements: Extraocular movements intact.      Pupils: Pupils are equal, round, and reactive to light.   Cardiovascular:      Rate and Rhythm: Normal rate and regular rhythm.   Pulmonary:      Effort: Pulmonary effort is normal.   Abdominal:      General: Bowel sounds are normal.   Musculoskeletal:         General: Normal range of motion.      Cervical back: Normal range of motion.   Skin:     General: Skin is warm.      Coloration: Skin is jaundiced.   Neurological:      General: No focal deficit present.      Mental Status: She is alert.   Psychiatric:         Mood and Affect: Mood normal.             Result Review    Result Review:  I have personally reviewed the results from the time of this admission to 2022 09:54 EDT and agree with these findings:  [x]  Laboratory  []  Microbiology  [x]  Radiology  []  EKG/Telemetry   []  Cardiology/Vascular   []  Pathology  [x]  Old records  []  Other:            Assessment & Plan      Brief Patient Summary:    56-year-old female with painless jaundice      nicotine, 1 patch, Transdermal, Q24H  pantoprazole, 40 mg, Intravenous, Q AM  piperacillin-tazobactam, 3.375 g, Intravenous, Once  piperacillin-tazobactam, 3.375 g, Intravenous,  Q8H  sodium chloride, 10 mL, Intravenous, Q12H  traZODone, 50 mg, Oral, Nightly       lactated ringers, 100 mL/hr, Last Rate: 100 mL/hr (05/13/22 2505)         Active Hospital Problems:  Active Hospital Problems    Diagnosis    • **Hepatitis    • Hyperbilirubinemia    • Tobacco abuse    • Amphetamine abuse (HCC)      Acute hepatitis:  -s/p CT abdomen pelvis 5/13/22  -MRCP 5/14/22--> 9 mm nonobstructing gallstone.  No cholecystitis.  Diffuse periportal edema.  Normal CBD.   -GI consulted    Methamphetamine abuse:    Tobacco abuse:  -Continue nicotine patch    DVT prophylaxis:  No DVT prophylaxis order currently exists.    CODE STATUS:    Level Of Support Discussed With: Patient  Code Status (Patient has no pulse and is not breathing): CPR (Attempt to Resuscitate)  Medical Interventions (Patient has pulse or is breathing): Full Support      Disposition:  I expect patient to be discharged defer.    This patient has been examined wearing appropriate Personal Protective Equipment and discussed with nursing. 05/14/22      Electronically signed by Jean Pierre Bowman DO, 05/14/22, 09:54 EDT.  [facility] Hospitalist Team

## 2022-05-14 NOTE — PLAN OF CARE
Goal Outcome Evaluation:  Plan of Care Reviewed With: patient        Progress: no change  Outcome Evaluation: Patient had MRCP in AM. GI runing labs currently. Patinet in bed with call light in reach.

## 2022-05-14 NOTE — H&P
[facility] Medicine Services      Patient Name: Grace Birmingham  : 1965  MRN: [id number]  Primary Care Physician:  Provider, No Known  Date of admission: 2022      Subjective      Chief Complaint: Jaundice    History of Present Illness: Grace Birmingham is a 56 y.o. female who presented to [facility] on 2022 complaining of jaundiced.  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  She does not see a doctor regularly but she has not been diagnosed with any medical problems in the past and she is currently on no medications.  The patient states that she does smoke about a pack of cigarettes a day and periodically uses methamphetamine but she smokes.  The last time she smoked was 2 to 3 days ago.  The reason she came today she looked in the mirror and saw that her skin had a very yellow tinge color and her eyes were yellow was well.  She came to the emergency room.  She is a little bit of nausea but no vomiting no diarrhea and no real abdominal pain.  In the emergency room she had severely elevated liver enzymes with an AST of 1900 and an ALT of 1900 along with a total bilirubin mostly direct of 14.6.  CT scan showed some biliary dilatation and gallbladder dilatation but no clear stone.  She states that she does not drink alcohol, has been taking a lot of energy drinks lately, and takes an occasional ibuprofen and Tylenol but no more than is recommended per the bottle and not every day.  We will admit the patient for acute hepatitis and evaluate for possible autoimmune causes while investigating possible biliary disease as well.      ROS jaundice, decreased appetite, fatigue, did have some URI symptoms a few days ago, slight abdominal distention, nausea but no vomiting and no diarrhea or constipation, she denies shortness of breath, fever, chills, diarrhea or  constipation, does report some mild peripheral edema, denies rash, altered mental status, periodic headaches but no different from her baseline, denies increased mucus production, shortness of breath, chest pain and the rest the 15 essential review of systems have been reviewed and are negative    Personal History     Past Medical History:   Diagnosis Date   • Anemia    • Arthritis    • Asthma    • Depression    • History of transfusion    • Kidney stone    • Pneumonia        Past Surgical History:   Procedure Laterality Date   • ABDOMINAL SURGERY N/A     Appendectomy, bilateral salpingectomy, bowel resection   • APPENDECTOMY         Family History: Her sister has breast cancer, both of her grandparents had diabetes on both sides of the family    Social History:  reports that she has been smoking cigarettes. She has a 40.00 pack-year smoking history. She does not have any smokeless tobacco history on file. She reports previous alcohol use. She reports previous drug use. Drugs: IV, Cocaine(coke), Marijuana, and Methamphetamines.  She states that she denies use of IV medications    Home Medications: She is not on any current home medications she does drink a lot of energy drinks  Prior to Admission Medications     None            Allergies:  No Known Allergies    Objective      Vitals:   Temp:  [97.8 °F (36.6 °C)] 97.8 °F (36.6 °C)  Heart Rate:  [79-90] 84  Resp:  [20] 20  BP: (129-154)/(72-91) 135/75    Physical Exam   General no distress  Head atraumatic  Eyes jaundice ocular motion intact pupils are reactive  Oropharynx membranes slightly dry no erythema jaundice was obvious  Neck no thyromegaly lymphadenopathy  Pulmonary lungs were clear to auscultation bilateral no accessory muscle use  Cardiac regular rate and rhythm could not appreciate any murmurs no edema  Abdomen mild distention there was no hepatosplenomegaly no guarding no rebound bowel sounds were present no pain on palpation  Extremities symmetric slight  edema warm to the touch able to move all extremities  Neuro cranial nerves II through XII intact and no obvious focal deficit  Psych patient was alert and oriented x4 answers questions appropriately appropriate mood and affect  Derm skin was jaundiced but did not perceive any rash    Result Review    Result Review:  I have personally reviewed the results from the time of this admission to 5/13/2022 20:30 EDT and agree with these findings:  [x]  Laboratory  [x]  Microbiology  [x]  Radiology  []  EKG/Telemetry   []  Cardiology/Vascular   []  Pathology  []  Old records  []  Other:  Most notable findings include:   Sodium 137, potassium 4.1, bicarb 26, creatinine 0.61, BUN 10, calcium 9, alkaline phosphatase 289, total protein 6.9, ALT 1900, AST 1900, total bilirubin 14.6, albumin 3.4, the bilirubin greater than 10 was direct  Ammonia level 18, amylase 87, lipase 29, magnesium 1.7  PTT 36.4, PT 14.9, INR 1.48  CBC White blood cells 5900, hemoglobin 12.8, platelets 308,000  Urine drug screen was positive for amphetamines  Monospot was negative Tylenol level was less than 5 undetectable    IMPRESSION:  1. Cholelithiasis with dilated gallbladder which may relate to  cholecystitis, correlate with associated clinical findings.  2. Common bile duct dilated up to 7 mm with suspicion of a small amount  of debris in the proximal CBD concerning for choledocholithiasis. This  could be confirmed with MRCP.  3. Prominent dilated pelvic venous vessels asymmetric to the left  abutting the uterus which are nonspecific but can be seen with pelvic  venous congestion.  4. Small left pleural effusion.  Assessment & Plan        Active Hospital Problems:  Active Hospital Problems    Diagnosis    • **Hepatitis    • Hyperbilirubinemia    • Tobacco abuse    • Amphetamine abuse (HCC)      Plan:   1.  Acute hepatitis -could be biliary in nature she does have cholelithiasis, MRCP has been ordered.  Also could be acute hepatitis so hepatitis panel  A, B, and C has been ordered.  Could be methamphetamine induced or other medications or over-the-counter medications.  But she does not state that she abuses alcohol or Tylenol or NSAIDs.  And looking for autoimmune so TAD, IgG4, antimitochondrial antibodies and anti-smooth muscle antibodies made the patient n.p.o. consulted general surgery and GI  2.  Hyperbilirubinemia see #1  3.  Tobacco abuse counseled her on cessation given nicotine patch  4.  Amphetamine abuse discussed the importance of abstinence    DVT prophylaxis:  Medical DVT prophylaxis orders are present.    CODE STATUS:    Level Of Support Discussed With: Patient  Code Status (Patient has no pulse and is not breathing): CPR (Attempt to Resuscitate)  Medical Interventions (Patient has pulse or is breathing): Full Support    Admission Status:  I believe this patient meets inpatient status.    I discussed the patient's findings and my recommendations with patient.    This patient has been examined wearing appropriate Personal Protective Equipment and discussed with . 05/13/22      Signature:

## 2022-05-14 NOTE — CONSULTS
"GI CONSULT  NOTE:    Referring Provider:   Dr Ramey     Chief complaint:   Probable biliary hepatitis     Subjective    jaundiced    History of present illness:    Patient is a 56-year-old female with a history of appendectomy with bowel resection and right oophorectomy, anemia, asthma, anxiety, depression and meth and alcohol usage who came in to the ER on 5/13/2022 with a complaint of jaundice and right upper quadrant pain.  Patient was evaluated in the ER and found to have a total bilirubin of 14.6 as well as elevated LFTs.  Patient underwent CT of the abdomen and pelvis that showed cholelithiasis with dilated gallbladder up to 7 mm.  Patient was admitted into the hospital and GI consult was ordered for probable biliary hepatitis.  Patient admits to using meth in the last 2 to 3 days, she drinks 1-2 daiquiri's a week.  She has been taking Aleve and aspirin 2-3 times a day.  She complains of \"body fever.\"  She has been passing gas and having bowel movements every day she does endorse acholic stools.  Denies any melena, hematochezia or bright red blood per rectum.  Labs: Total bilirubin is up to 14.3 today, alk phos 272, AST 2095, ALT 2097.  INR 1.51.  Amylase and lipase are normal.  Tylenol level normal.  Sodium is 135, creatinine 0.56.  HIV negative and COVID-negative.  Patient smokes 1 pack of cigarettes a day.  Remote history of marijuana usage.  She smokes meth.  Denies IV drug usage.  Denies any illicit oral pills. Did take a liver cleanse when she noted to have jaundice.  She has had a recent tick bite.  She has been fatigued for about 2 weeks.  Has been experiencing recent GERD symptoms.     Endo History:  Nothing in [facility].  Patient states she has never had any endoscopies.    Past Medical History:  Past Medical History:   Diagnosis Date   • Anemia    • Arthritis    • Asthma    • Depression    • History of transfusion    • Kidney stone    • Pneumonia        Past Surgical History:  Past Surgical History: "   Procedure Laterality Date   • ABDOMINAL SURGERY N/A     Appendectomy, bilateral salpingectomy, bowel resection   • APPENDECTOMY         Social History:  Social History     Tobacco Use   • Smoking status: Current Every Day Smoker     Packs/day: 1.00     Years: 40.00     Pack years: 40.00     Types: Cigarettes   Vaping Use   • Vaping Use: Never used   Substance Use Topics   • Alcohol use: Not Currently   • Drug use: Not Currently     Types: IV, Cocaine(coke), Marijuana, Methamphetamines     Comment: Meth use last 3 weeks ago       Family History:  History reviewed. No pertinent family history.    Medications:  No medications prior to admission.       Scheduled Meds:enoxaparin, 40 mg, Subcutaneous, Q24H  nicotine, 1 patch, Transdermal, Q24H  pantoprazole, 40 mg, Intravenous, Q AM  sodium chloride, 10 mL, Intravenous, Q12H  traZODone, 50 mg, Oral, Nightly      Continuous Infusions:lactated ringers, 100 mL/hr, Last Rate: 100 mL/hr (05/13/22 7251)      PRN Meds:.•  acetaminophen **OR** acetaminophen **OR** acetaminophen  •  aluminum-magnesium hydroxide-simethicone  •  HYDROmorphone  •  ipratropium-albuterol  •  melatonin  •  nitroglycerin  •  ondansetron **OR** ondansetron  •  [COMPLETED] Insert peripheral IV **AND** sodium chloride  •  sodium chloride    ALLERGIES:  Patient has no known allergies.    ROS:  Review of Systems   Constitutional: Positive for fatigue.   Gastrointestinal: Positive for abdominal pain and nausea. Negative for abdominal distention, anal bleeding, blood in stool, constipation, diarrhea, rectal pain and vomiting.   Skin: Positive for color change.     The following systems were reviewed and negative;   Constitution:  No fevers, chills, no unintentional weight loss  Skin: no rash   Eyes:  No blurry vision, no eye pain  HENT:  No change in hearing or smell  Resp:  No dyspnea or cough  CV:  No chest pain or palpitations  :  No dysuria, hematuria  Musculoskeletal:  No leg cramps or  "arthralgias  Neuro:  No tremor, no numbness  Psych:  No depression or confusion    Objective  Resting in hospital bed.  NAD.  Room 312.  No family present.  Just returned from MRI.    Vital Signs:   Vitals:    05/13/22 1658 05/13/22 1809 05/13/22 2000 05/13/22 2209   BP: 148/72 129/86 135/75 149/97   BP Location: Left arm Left arm Left arm Left arm   Patient Position: Lying Lying Lying Lying   Pulse: 90 79 84 82   Resp:    18   Temp:    97.6 °F (36.4 °C)   TempSrc:    Oral   SpO2: 100% 100% 99% 99%   Weight:    63.1 kg (139 lb 1.8 oz)   Height:    157.5 cm (62\")       Physical Exam:   General Appearance:    Awake and alert, in no acute distress   Head:    Normocephalic, without obvious abnormality, atraumatic   Eyes:            Conjunctivae normal, icteric sclerae, pupils equal   Ears:    Ears appear intact with no abnormalities noted   Throat:   No oral lesions, no thrush, oral mucosa moist   Neck:   Supple, no JVD   Lungs:     respirations regular, even and unlabored       Chest Wall:    No abnormalities observed   Abdomen:     Normal bowel sounds, soft, nontender, no rebound or guarding, nondistended, no hepatosplenomegaly   Rectal:     Deferred   Extremities:   Moves all extremities, no edema, no cyanosis   Pulses:   Pulses palpable and equal bilaterally   Skin:   No rash, no jaundice, normal palpation       Neurologic:   Cranial nerves 2 - 12 grossly intact, no asterixis       Results Review:   I reviewed the patient's labs and imaging.  Lab Results (last 24 hours)     Procedure Component Value Units Date/Time    Hepatitis Panel, Acute [[id number]] Collected: 05/14/22 0200    Specimen: Blood Updated: 05/14/22 0613    Bilirubin, Direct [[id number]]  (Abnormal) Collected: 05/14/22 0200    Specimen: Blood Updated: 05/14/22 0426     Bilirubin, Direct >10.0 mg/dL     Comprehensive Metabolic Panel [[id number]]  (Abnormal) Collected: 05/14/22 0200    Specimen: Blood Updated: 05/14/22 0401     Glucose 84 mg/dL      BUN 9 " mg/dL      Creatinine 0.56 mg/dL      Sodium 135 mmol/L      Potassium 3.9 mmol/L      Chloride 100 mmol/L      CO2 23.0 mmol/L      Calcium 9.2 mg/dL      Total Protein 6.5 g/dL      Albumin 3.10 g/dL      ALT (SGPT) 2,097 U/L      AST (SGOT) 2,095 U/L      Alkaline Phosphatase 272 U/L      Total Bilirubin 14.3 mg/dL      Globulin 3.4 gm/dL      A/G Ratio 0.9 g/dL      BUN/Creatinine Ratio 16.1     Anion Gap 12.0 mmol/L      eGFR 105.9 mL/min/1.73      Comment: National Kidney Foundation and American Society of Nephrology (ASN) Task Force recommended calculation based on the Chronic Kidney Disease Epidemiology Collaboration (CKD-EPI) equation refit without adjustment for race.       Narrative:      GFR Normal >60  Chronic Kidney Disease <60  Kidney Failure <15      HIV-1 & HIV-2 Antibodies [[id number]]  (Normal) Collected: 05/14/22 0200    Specimen: Blood Updated: 05/14/22 0359    Narrative:      The following orders were created for panel order HIV-1 & HIV-2 Antibodies.  Procedure                               Abnormality         Status                     ---------                               -----------         ------                     HIV-1 / O / 2 Ag / Antib...[[id number]]  Normal              Final result                 Please view results for these tests on the individual orders.    HIV-1 / O / 2 Ag / Antibody 4th Generation [[id number]]  (Normal) Collected: 05/14/22 0200    Specimen: Blood Updated: 05/14/22 0359     HIV-1/ HIV-2 Non-Reactive     Comment: A non-reactive test result does not preclude the possibility of exposure to HIV or infection with HIV. An antibody response to recent exposure may take several months to reach detectable levels.       Narrative:      The HIV antibody/antigen combo assay is a qualitative assay for HIV that includes the p24 antigen as well as antibodies to HIV types 1 and 2. This test is intended to be used as a screening assay in the diagnosis of HIV infection in patients  over the age of 2.  Results may be falsely decreased if patient taking Biotin.      Magnesium [[id number]]  (Normal) Collected: 05/14/22 0200    Specimen: Blood Updated: 05/14/22 0336     Magnesium 1.8 mg/dL     Protime-INR [[id number]]  (Abnormal) Collected: 05/14/22 0200    Specimen: Blood Updated: 05/14/22 0328     Protime 15.2 Seconds      INR 1.51    CBC & Differential [[id number]]  (Abnormal) Collected: 05/14/22 0200    Specimen: Blood Updated: 05/14/22 0324    Narrative:      The following orders were created for panel order CBC & Differential.  Procedure                               Abnormality         Status                     ---------                               -----------         ------                     CBC Auto Differential[[id number]]        Abnormal            Final result                 Please view results for these tests on the individual orders.    CBC Auto Differential [[id number]]  (Abnormal) Collected: 05/14/22 0200    Specimen: Blood Updated: 05/14/22 0324     WBC 6.10 10*3/mm3      RBC 3.94 10*6/mm3      Hemoglobin 11.8 g/dL      Hematocrit 35.4 %      MCV 89.9 fL      MCH 29.9 pg      MCHC 33.3 g/dL      RDW 16.7 %      RDW-SD 52.9 fl      MPV 10.3 fL      Platelets 272 10*3/mm3      Neutrophil % 57.7 %      Lymphocyte % 26.6 %      Monocyte % 13.2 %      Eosinophil % 1.4 %      Basophil % 1.1 %      Neutrophils, Absolute 3.50 10*3/mm3      Lymphocytes, Absolute 1.60 10*3/mm3      Monocytes, Absolute 0.80 10*3/mm3      Eosinophils, Absolute 0.10 10*3/mm3      Basophils, Absolute 0.10 10*3/mm3      nRBC 0.2 /100 WBC     Anti-Smooth Muscle Antibody Titer [[id number]] Collected: 05/14/22 0200    Specimen: Blood Updated: 05/14/22 0310    Mitochondrial Antibodies, M2 [[id number]] Collected: 05/14/22 0200    Specimen: Blood Updated: 05/14/22 0310    TAD [[id number]] Collected: 05/14/22 0200    Specimen: Blood Updated: 05/14/22 0309    IgG 4 [[id number]] Collected: 05/14/22 0203    Specimen: Blood  Updated: 05/14/22 0309    Mononucleosis Screen [[id number]]  (Normal) Collected: 05/13/22 1539    Specimen: Blood Updated: 05/13/22 2041     Monospot Negative    COVID PRE-OP / PRE-PROCEDURE SCREENING ORDER (NO ISOLATION) - Swab, Nasopharynx [[id number]]  (Normal) Collected: 05/13/22 2000    Specimen: Swab from Nasopharynx Updated: 05/13/22 2030    Narrative:      The following orders were created for panel order COVID PRE-OP / PRE-PROCEDURE SCREENING ORDER (NO ISOLATION) - Swab, Nasopharynx.  Procedure                               Abnormality         Status                     ---------                               -----------         ------                     COVID-19,CEPHEID/GUTIERREZ,CO...[[id number]]  Normal              Final result                 Please view results for these tests on the individual orders.    COVID-19,CEPHEID/GUTIERREZ,COR/KARISHMA/PAD/NILA IN-HOUSE(OR EMERGENT/ADD-ON),NP SWAB IN TRANSPORT MEDIA 3-4 HR TAT, RT-PCR - Swab, Nasopharynx [[id number]]  (Normal) Collected: 05/13/22 2000    Specimen: Swab from Nasopharynx Updated: 05/13/22 2030     COVID19 Not Detected    Narrative:      Fact sheet for providers: https://www.fda.gov/media/704807/download     Fact sheet for patients: https://www.fda.gov/media/397051/download  Fact sheet for providers: https://www.fda.gov/media/902256/download    Fact sheet for patients: https://www.fda.gov/media/659869/download    Test performed by PCR.    Urine Drug Screen - Urine, Clean Catch [[id number]]  (Abnormal) Collected: 05/13/22 1702    Specimen: Urine, Clean Catch Updated: 05/13/22 1737     Amphet/Methamphet, Screen Positive     Barbiturates Screen, Urine Negative     Benzodiazepine Screen, Urine Negative     Cocaine Screen, Urine Negative     Opiate Screen Negative     THC, Screen, Urine Negative     Methadone Screen, Urine Negative     Oxycodone Screen, Urine Negative    Narrative:      Negative Thresholds Per Drugs Screened:    Amphetamines                 500  ng/ml  Barbiturates                 200 ng/ml  Benzodiazepines              100 ng/ml  Cocaine                      300 ng/ml  Methadone                    300 ng/ml  Opiates                      300 ng/ml  Oxycodone                    100 ng/ml  THC                           50 ng/ml    The Normal Value for all drugs tested is negative. This report includes final unconfirmed screening results to be used for medical treatment purposes only. Unconfirmed results must not be used for non-medical purposes such as employment or legal testing. Clinical consideration should be applied to any drug of abuse test, particularly when unconfirmed results are used.          All urine drugs of abuse requests without chain of custody are for medical screening purposes only.  False positives are possible.      Urinalysis, Microscopic Only - Urine, Clean Catch [[id number]]  (Abnormal) Collected: 05/13/22 1702    Specimen: Urine, Clean Catch Updated: 05/13/22 1726     RBC, UA 0-2 /HPF      WBC, UA 0-2 /HPF      Bacteria, UA None Seen /HPF      Squamous Epithelial Cells, UA 0-2 /HPF      Hyaline Casts, UA None Seen /LPF      Methodology Automated Microscopy    Urinalysis With Microscopic If Indicated (No Culture) - Urine, Clean Catch [[id number]]  (Abnormal) Collected: 05/13/22 1702    Specimen: Urine, Clean Catch Updated: 05/13/22 1722     Color, UA Dark Yellow     Appearance, UA Clear     pH, UA 6.0     Specific Gravity, UA 1.010     Glucose, UA Negative     Ketones, UA Negative     Bilirubin, UA Moderate (2+)     Comment: Confirmation testing is unavailable.  A serum bilirubin is recommended for further assessment.        Blood, UA Small (1+)     Protein, UA Negative     Leuk Esterase, UA Negative     Nitrite, UA Negative     Urobilinogen, UA 0.2 E.U./dL    Bilirubin, Direct [[id number]]  (Abnormal) Collected: 05/13/22 1539    Specimen: Blood Updated: 05/13/22 1621     Bilirubin, Direct >10.0 mg/dL     Comprehensive Metabolic Panel  [[id number]]  (Abnormal) Collected: 05/13/22 1539    Specimen: Blood Updated: 05/13/22 1621     Glucose 99 mg/dL      BUN 10 mg/dL      Creatinine 0.61 mg/dL      Sodium 137 mmol/L      Potassium 4.1 mmol/L      Chloride 102 mmol/L      CO2 26.0 mmol/L      Calcium 9.0 mg/dL      Total Protein 6.9 g/dL      Albumin 3.40 g/dL      ALT (SGPT) 1,922 U/L      AST (SGOT) 1,901 U/L      Alkaline Phosphatase 289 U/L      Total Bilirubin 14.6 mg/dL      Globulin 3.5 gm/dL      A/G Ratio 1.0 g/dL      BUN/Creatinine Ratio 16.4     Anion Gap 9.0 mmol/L      eGFR 103.8 mL/min/1.73      Comment: National Kidney Foundation and American Society of Nephrology (ASN) Task Force recommended calculation based on the Chronic Kidney Disease Epidemiology Collaboration (CKD-EPI) equation refit without adjustment for race.       Narrative:      GFR Normal >60  Chronic Kidney Disease <60  Kidney Failure <15      Ammonia [[id number]]  (Normal) Collected: 05/13/22 1539    Specimen: Blood Updated: 05/13/22 1618     Ammonia 18 umol/L     Acetaminophen Level [[id number]]  (Normal) Collected: 05/13/22 1539    Specimen: Blood Updated: 05/13/22 1607     Acetaminophen <5.0 mcg/mL     Narrative:      Acetaminophen Therapeutic Range  5-20 ug/mL      Hours after ingestion            Toxic Value    4 Hours                           150 ug/mL    8 Hours                            70 ug/mL   12 Hours                            40 ug/mL   16 Hours                            20 ug/mL    These values apply to a single ingestion only.     Lipase [[id number]]  (Normal) Collected: 05/13/22 1539    Specimen: Blood Updated: 05/13/22 1607     Lipase 29 U/L     Amylase [[id number]]  (Normal) Collected: 05/13/22 1539    Specimen: Blood Updated: 05/13/22 1607     Amylase 87 U/L     Ethanol [[id number]] Collected: 05/13/22 1539    Specimen: Blood Updated: 05/13/22 1607     Ethanol % <0.010 %     Narrative:      Plasma Ethanol Clinical Symptoms:    ETOH (%)                Clinical Symptom  .01-.05              No apparent influence  .03-.12              Euphoria, Diminished judgment and attention   .09-.25              Impaired comprehension, Muscle incoordination  .18-.30              Confusion, Staggered gait, Slurred speech  .25-.40              Markedly decreased response to stimuli, unable to stand or                        walk, vomitting, sleep or stupor  .35-.50              Comatose, Anesthesia, Subnormal body temperature        Magnesium [[id number]]  (Normal) Collected: 05/13/22 1539    Specimen: Blood Updated: 05/13/22 1607     Magnesium 1.7 mg/dL     Protime-INR [[id number]]  (Abnormal) Collected: 05/13/22 1539    Specimen: Blood Updated: 05/13/22 1606     Protime 14.9 Seconds      INR 1.48    aPTT [[id number]]  (Abnormal) Collected: 05/13/22 1539    Specimen: Blood Updated: 05/13/22 1606     PTT 36.4 seconds     CBC & Differential [[id number]]  (Abnormal) Collected: 05/13/22 1539    Specimen: Blood Updated: 05/13/22 1555    Narrative:      The following orders were created for panel order CBC & Differential.  Procedure                               Abnormality         Status                     ---------                               -----------         ------                     CBC Auto Differential[[id number]]        Abnormal            Final result                 Please view results for these tests on the individual orders.    CBC Auto Differential [[id number]]  (Abnormal) Collected: 05/13/22 1539    Specimen: Blood Updated: 05/13/22 1555     WBC 5.90 10*3/mm3      RBC 4.33 10*6/mm3      Hemoglobin 12.8 g/dL      Hematocrit 38.7 %      MCV 89.2 fL      MCH 29.4 pg      MCHC 33.0 g/dL      RDW 17.0 %      RDW-SD 53.4 fl      MPV 9.7 fL      Platelets 308 10*3/mm3      Neutrophil % 53.8 %      Lymphocyte % 26.7 %      Monocyte % 16.7 %      Eosinophil % 2.4 %      Basophil % 0.4 %      Neutrophils, Absolute 3.20 10*3/mm3      Lymphocytes, Absolute 1.60 10*3/mm3       Monocytes, Absolute 1.00 10*3/mm3      Eosinophils, Absolute 0.10 10*3/mm3      Basophils, Absolute 0.00 10*3/mm3      nRBC 0.2 /100 WBC           Imaging Results (Last 24 Hours)     Procedure Component Value Units Date/Time    MRI abdomen w wo contrast mrcp [[id number]] Resulted: 05/14/22 0745     Updated: 05/14/22 0745    CT Abdomen Pelvis With Contrast [[id number]] Collected: 05/13/22 1827     Updated: 05/13/22 1838    Narrative:      CT ABDOMEN PELVIS W CONTRAST-     Date of Exam: 5/13/2022 5:28 PM     Indication: Jaundice, RUQ pain; R17-Unspecified jaundice; R10.11-Right  upper quadrant pain.     Comparison: None available.     Technique: Contiguous axial CT images were obtained from the lung bases  to the pubic symphysis following uneventful administration of Isovue  intravenous contrast. Sagittal and coronal reconstructions were  performed.  Automated exposure control and iterative reconstruction  methods were used.     FINDINGS:  Lower chest demonstrates a small left-sided pleural effusion. There is  mild linear atelectasis/scarring at the left lower lobe. No  consolidation at the right lung base. Negative for right-sided effusion.  Heart size normal. No pericardial effusion.      The liver is normal in size. The gallbladder is distended with  cholelithiasis noted. Mild gallbladder wall thickening. There is mild  periportal edema. The adrenal glands are normal. The pancreas is without  evidence of acute pancreatitis. No pancreatic duct dilatation. The  common bile duct measures 7 mm on image 52 with question of a small  amount of layering debris within the proximal CBD (axial image 52).      Kidneys enhance symmetrically. Negative for hydroureteronephrosis.  Urinary bladder is thin walled. Uterus without acute abnormality. There  are prominent pelvic venous vessels abutting the uterus asymmetric the  left which could relate to pelvic venous congestion. No adnexal mass.     Negative for pneumoperitoneum. No  findings of bowel obstruction.  Appendix not visualized, no findings to suggest appendicitis. The portal  vein, splenic vein, superior mesenteric vein are patent. Mild vascular  calcifications involving the abdominal aorta without aneurysm. No fluid  collection the abdomen or pelvis. There is a transitional lumbosacral  vertebral body. There is mild grade 1 anterolisthesis of L4 on L5 and L5  on S1 related to facet arthritis. Rudimentary disc noted at S1-S2.       Impression:      1. Cholelithiasis with dilated gallbladder which may relate to  cholecystitis, correlate with associated clinical findings.  2. Common bile duct dilated up to 7 mm with suspicion of a small amount  of debris in the proximal CBD concerning for choledocholithiasis. This  could be confirmed with MRCP.  3. Prominent dilated pelvic venous vessels asymmetric to the left  abutting the uterus which are nonspecific but can be seen with pelvic  venous congestion.  4. Small left pleural effusion.              Electronically Signed By-Flo Sylvester MD On:5/13/2022 6:36 PM  This report was finalized on [id number] by  Flo Sylvester MD.             ASSESSMENT AND PLAN:  Acute biliary hepatitis consider related to choledocholithiasis versus cholecystitis versus viral versus medications  Elevated LFTs due to above  Abnormal CT indicating cholelithiasis with dilated biliary duct of 7 mm  Right upper quadrant abdominal pain consider above  Tobacco abuse  Meth usage  Alcohol usage  Hyponatremia  Mild normocytic anemia  Elevated INR consider related to hepatitis  Recent tick bite  GERD    PLAN:  ERCP depending upon MRCP. Recommend surgery consult.   Pending acute hepatitis profile.  Pending TAD, autoimmune markers.  Continue IV PPI  Would recommend prophylactic antibiotics for cholecystitis  Antiemetic and pain medications as needed  Hold Lovenox for pending ERCP versus cholecystectomy  Recommend tobacco, alcohol and meth cessation  NPO, diet after seen by  Dr. Bautista.  Add zinc  Pending TAD and acute hepatitis panel.  Add CMV and EBV as well as tick panel.  Consider liver biopsy if not improving.  Supportive care in the interim    I discussed the patient's findings and my recommendations with the patient.  Sonia Stockton, APRN  05/14/22  07:46 EDT    Time:

## 2022-05-14 NOTE — CONSULTS
Inpatient General Surgery Consult  Consult performed by: Manjinder Pickering MD  Consult ordered by: Kris Ramey MD  Reason for consult: Cholelithiasis          General Surgery Consult Note      Name: Grace Birmingham ADMIT: 2022   : 1965  PCP: Jacob, Arabella Known    MRN: [id number] LOS: 1 days   AGE/SEX: 56 y.o. female  ROOM: [address] [place] [address]      Patient Care Team:  Provider, No Known as PCP - General  Chief Complaint   Patient presents with   • Jaundice     Pt c/o yellow eyes, fatigue since last week       Subjective   56-year-old lady admitted to hospital with about 1 week history of weakness fatigue and jaundice.  She has had some mild right-sided abdominal pain not severe.  Does not seem to be associated with meals.  Has been having some chalky bowel movements and dark discolored urine.  On arrival she was found to have signs of liver failure including a bilirubin of 14 AST and ALT which went from 1000 to about 2000 each.  Initial CT scan showed cholelithiasis and possible choledocholithiasis which was concerning for obstructive jaundice but subsequent MR CP did not show any evidence of choledocholithiasis.  It also shows a solitary stone within the gallbladder without any evidence of cholecystitis.    Her hepatitis panels are all pending.    Past Medical History:   Diagnosis Date   • Anemia    • Arthritis    • Asthma    • Depression    • History of transfusion    • Kidney stone    • Pneumonia      Past Surgical History:   Procedure Laterality Date   • ABDOMINAL SURGERY N/A     Appendectomy, bilateral salpingectomy, bowel resection   • APPENDECTOMY       History reviewed. No pertinent family history.  Social History     Tobacco Use   • Smoking status: Current Every Day Smoker     Packs/day: 1.00     Years: 40.00     Pack years: 40.00     Types: Cigarettes   Vaping Use   • Vaping Use: Never used   Substance Use Topics   • Alcohol use: Not Currently   • Drug use: Not Currently     Types: IV,  "Cocaine(coke), Marijuana, Methamphetamines     Comment: Meth use last 3 weeks ago     No medications prior to admission.     nicotine, 1 patch, Transdermal, Q24H  pantoprazole, 40 mg, Intravenous, Q AM  piperacillin-tazobactam, 3.375 g, Intravenous, Q8H  sodium chloride, 10 mL, Intravenous, Q12H  thiamine, 100 mg, Oral, Daily  traZODone, 50 mg, Oral, Nightly  zinc sulfate, 220 mg, Oral, Daily      lactated ringers, 100 mL/hr, Last Rate: 100 mL/hr (05/14/22 1131)      •  acetaminophen **OR** acetaminophen **OR** acetaminophen  •  aluminum-magnesium hydroxide-simethicone  •  ipratropium-albuterol  •  melatonin  •  nitroglycerin  •  ondansetron **OR** ondansetron  •  [COMPLETED] Insert peripheral IV **AND** sodium chloride  •  sodium chloride  Patient has no known allergies.    Review of Systems   Constitutional: Positive for fatigue. Negative for chills and fever.   HENT: Negative for sore throat and trouble swallowing.    Eyes: Negative for blurred vision and double vision.   Respiratory: Positive for shortness of breath. Negative for cough.    Cardiovascular: Positive for leg swelling. Negative for chest pain.   Gastrointestinal: Positive for abdominal pain. Negative for abdominal distention, blood in stool, constipation, diarrhea, nausea and vomiting.   Genitourinary: Negative for dysuria and hematuria.   Skin: Positive for color change. Negative for rash and wound.   Neurological: Negative for dizziness and confusion.   Psychiatric/Behavioral: Positive for depressed mood. Negative for agitation.        Objective     Vital Signs and Labs:  Vital Signs Patient Vitals for the past 24 hrs:   BP Temp Temp src Pulse Resp SpO2 Height Weight   05/14/22 0818 116/72 97.2 °F (36.2 °C) Oral 70 18 99 % -- --   05/13/22 2209 149/97 97.6 °F (36.4 °C) Oral 82 18 99 % 157.5 cm (62\") 63.1 kg (139 lb 1.8 oz)   05/13/22 2000 135/75 -- -- 84 -- 99 % -- --   05/13/22 1809 129/86 -- -- 79 -- 100 % -- --   05/13/22 1658 148/72 -- -- 90 " -- 100 % -- --   05/13/22 1519 129/83 -- -- 82 -- 100 % -- --       Physical Exam:  Physical Exam  Constitutional:       Appearance: She is well-developed. She is ill-appearing.   HENT:      Head: Normocephalic and atraumatic.   Eyes:      General: Scleral icterus present.         Right eye: No discharge.         Left eye: No discharge.   Neck:      Trachea: No tracheal deviation.   Cardiovascular:      Rate and Rhythm: Normal rate.      Pulses: Normal pulses.   Pulmonary:      Effort: Pulmonary effort is normal. No respiratory distress.   Abdominal:      General: There is no distension.      Palpations: Abdomen is soft.      Comments: Mild right-sided tenderness palpation   Musculoskeletal:         General: No swelling or tenderness.      Cervical back: Neck supple. No tenderness. No muscular tenderness.   Skin:     General: Skin is warm and dry.      Coloration: Skin is jaundiced.   Neurological:      General: No focal deficit present.      Mental Status: She is alert. Mental status is at baseline.   Psychiatric:         Mood and Affect: Mood normal.         Behavior: Behavior normal.         CBC    Results from last 7 days   Lab Units 05/14/22  0200 05/13/22  1539   WBC 10*3/mm3 6.10 5.90   HEMOGLOBIN g/dL 11.8* 12.8   PLATELETS 10*3/mm3 272 308     Coag   Results from last 7 days   Lab Units 05/14/22  0200 05/13/22  1539   INR  1.51* 1.48*   APTT seconds  --  36.4*     CMP   Results from last 7 days   Lab Units 05/14/22  0200 05/13/22  1539   SODIUM mmol/L 135* 137   POTASSIUM mmol/L 3.9 4.1   CHLORIDE mmol/L 100 102   CO2 mmol/L 23.0 26.0   BUN mg/dL 9 10   CREATININE mg/dL 0.56* 0.61   GLUCOSE mg/dL 84 99   ALBUMIN g/dL 3.10* 3.40*   BILIRUBIN mg/dL 14.3* 14.6*   ALK PHOS U/L 272* 289*   AST (SGOT) U/L 2,095* 1,901*   ALT (SGPT) U/L 2,097* 1,922*   AMYLASE U/L  --  87   LIPASE U/L  --  29   AMMONIA umol/L  --  18     Radiology(recent) CT Abdomen Pelvis With Contrast    Result Date: 5/13/2022  1. Cholelithiasis  with dilated gallbladder which may relate to cholecystitis, correlate with associated clinical findings. 2. Common bile duct dilated up to 7 mm with suspicion of a small amount of debris in the proximal CBD concerning for choledocholithiasis. This could be confirmed with MRCP. 3. Prominent dilated pelvic venous vessels asymmetric to the left abutting the uterus which are nonspecific but can be seen with pelvic venous congestion. 4. Small left pleural effusion.     Electronically Signed By-Flo Sylvester MD On:5/13/2022 6:36 PM This report was finalized on [id number] by  Flo Sylvester MD.    MRI abdomen w wo contrast mrcp    Result Date: 5/14/2022  IMPRESSION : No evidence of choledocholithiasis or biliary obstruction. Normal diameter of the common bile duct.  Solitary 9 mm gallstone, nonobstructing. No evidence of cholecystitis.  Diffuse periportal edema, nonspecific. This could relate to hepatitis or hepatic congestion as primary differential considerations.  Electronically Signed By-Mulugeta Aguillon On:5/14/2022 9:36 AM This report was finalized on [id number] by  Mulugeta Aguillon, .      I reviewed the patient's new clinical results.  I reviewed the patient's new imaging results and agree with the interpretation.    Assessment & Plan       Hepatitis    Hyperbilirubinemia    Tobacco abuse    Amphetamine abuse (HCC)    Jaundice      56 y.o. female with significant abnormality of her liver function.  Imaging showing cholelithiasis without cholecystitis      Clinically my initial suspicion was choledocholithiasis causing obstructive jaundice given the sludge seen on the CT scan within the bile duct.  MRI does not show choledocholithiasis.  She has a medical panel evaluating the etiology of her liver failure that is pending.  I discussed her case with gastroenterology.    Unless this does represent choledocholithiasis I would not recommend cholecystectomy as an inpatient.  The gallstone that seen on the imaging is likely  silent.  In the setting of hepatitis I certainly would not recommend cholecystectomy.  Continue to follow to see what the etiology is of her liver failure and to  about cholecystectomy timing if indicated.      This note was created using Dragon Voice Recognition software.    Manjinder Pickering MD  05/14/22  14:15 EDT

## 2022-05-15 LAB
ALBUMIN SERPL-MCNC: 2.8 G/DL (ref 3.5–5.2)
ALBUMIN/GLOB SERPL: 0.8 G/DL
ALP SERPL-CCNC: 251 U/L (ref 39–117)
ALT SERPL W P-5'-P-CCNC: 2088 U/L (ref 1–33)
ANION GAP SERPL CALCULATED.3IONS-SCNC: 10 MMOL/L (ref 5–15)
AST SERPL-CCNC: 1887 U/L (ref 1–32)
BASOPHILS # BLD AUTO: 0.1 10*3/MM3 (ref 0–0.2)
BASOPHILS NFR BLD AUTO: 1.1 % (ref 0–1.5)
BILIRUB SERPL-MCNC: 13.7 MG/DL (ref 0–1.2)
BUN SERPL-MCNC: 9 MG/DL (ref 6–20)
BUN/CREAT SERPL: 15.5 (ref 7–25)
CALCIUM SPEC-SCNC: 8.7 MG/DL (ref 8.6–10.5)
CHLORIDE SERPL-SCNC: 103 MMOL/L (ref 98–107)
CK SERPL-CCNC: 23 U/L (ref 20–180)
CO2 SERPL-SCNC: 24 MMOL/L (ref 22–29)
CREAT SERPL-MCNC: 0.58 MG/DL (ref 0.57–1)
DEPRECATED RDW RBC AUTO: 54.7 FL (ref 37–54)
EGFRCR SERPLBLD CKD-EPI 2021: 105 ML/MIN/1.73
EOSINOPHIL # BLD AUTO: 0.1 10*3/MM3 (ref 0–0.4)
EOSINOPHIL NFR BLD AUTO: 1.1 % (ref 0.3–6.2)
ERYTHROCYTE [DISTWIDTH] IN BLOOD BY AUTOMATED COUNT: 17.2 % (ref 12.3–15.4)
GLOBULIN UR ELPH-MCNC: 3.3 GM/DL
GLUCOSE SERPL-MCNC: 142 MG/DL (ref 65–99)
HCT VFR BLD AUTO: 34.4 % (ref 34–46.6)
HGB BLD-MCNC: 11.5 G/DL (ref 12–15.9)
INR PPP: 1.65 (ref 0.93–1.1)
LYMPHOCYTES # BLD AUTO: 0.9 10*3/MM3 (ref 0.7–3.1)
LYMPHOCYTES NFR BLD AUTO: 19.1 % (ref 19.6–45.3)
MAGNESIUM SERPL-MCNC: 1.6 MG/DL (ref 1.6–2.6)
MCH RBC QN AUTO: 30 PG (ref 26.6–33)
MCHC RBC AUTO-ENTMCNC: 33.3 G/DL (ref 31.5–35.7)
MCV RBC AUTO: 90.1 FL (ref 79–97)
MONOCYTES # BLD AUTO: 0.7 10*3/MM3 (ref 0.1–0.9)
MONOCYTES NFR BLD AUTO: 13.3 % (ref 5–12)
NEUTROPHILS NFR BLD AUTO: 3.2 10*3/MM3 (ref 1.7–7)
NEUTROPHILS NFR BLD AUTO: 65.4 % (ref 42.7–76)
NRBC BLD AUTO-RTO: 0.1 /100 WBC (ref 0–0.2)
PLATELET # BLD AUTO: 241 10*3/MM3 (ref 140–450)
PMV BLD AUTO: 10.1 FL (ref 6–12)
POTASSIUM SERPL-SCNC: 3.9 MMOL/L (ref 3.5–5.2)
PROT SERPL-MCNC: 6.1 G/DL (ref 6–8.5)
PROTHROMBIN TIME: 16.5 SECONDS (ref 9.6–11.7)
RBC # BLD AUTO: 3.82 10*6/MM3 (ref 3.77–5.28)
SODIUM SERPL-SCNC: 137 MMOL/L (ref 136–145)
WBC NRBC COR # BLD: 4.9 10*3/MM3 (ref 3.4–10.8)

## 2022-05-15 PROCEDURE — 99231 SBSQ HOSP IP/OBS SF/LOW 25: CPT | Performed by: HOSPITALIST

## 2022-05-15 PROCEDURE — 85025 COMPLETE CBC W/AUTO DIFF WBC: CPT | Performed by: HOSPITALIST

## 2022-05-15 PROCEDURE — 80053 COMPREHEN METABOLIC PANEL: CPT | Performed by: HOSPITALIST

## 2022-05-15 PROCEDURE — 85610 PROTHROMBIN TIME: CPT | Performed by: NURSE PRACTITIONER

## 2022-05-15 PROCEDURE — 82550 ASSAY OF CK (CPK): CPT | Performed by: HOSPITALIST

## 2022-05-15 PROCEDURE — 83735 ASSAY OF MAGNESIUM: CPT | Performed by: HOSPITALIST

## 2022-05-15 PROCEDURE — 25010000002 PIPERACILLIN SOD-TAZOBACTAM PER 1 G: Performed by: NURSE PRACTITIONER

## 2022-05-15 PROCEDURE — 99232 SBSQ HOSP IP/OBS MODERATE 35: CPT | Performed by: SURGERY

## 2022-05-15 RX ORDER — PANTOPRAZOLE SODIUM 40 MG/1
40 TABLET, DELAYED RELEASE ORAL
Status: DISCONTINUED | OUTPATIENT
Start: 2022-05-16 | End: 2022-05-17 | Stop reason: HOSPADM

## 2022-05-15 RX ORDER — OXYCODONE HYDROCHLORIDE 5 MG/1
5 TABLET ORAL EVERY 6 HOURS PRN
Status: DISCONTINUED | OUTPATIENT
Start: 2022-05-15 | End: 2022-05-17 | Stop reason: HOSPADM

## 2022-05-15 RX ADMIN — PANTOPRAZOLE SODIUM 40 MG: 40 INJECTION, POWDER, FOR SOLUTION INTRAVENOUS at 05:13

## 2022-05-15 RX ADMIN — OXYCODONE 5 MG: 5 TABLET ORAL at 22:33

## 2022-05-15 RX ADMIN — PIPERACILLIN AND TAZOBACTAM 3.38 G: 3; .375 INJECTION, POWDER, LYOPHILIZED, FOR SOLUTION INTRAVENOUS at 00:47

## 2022-05-15 RX ADMIN — Medication 10 ML: at 20:45

## 2022-05-15 RX ADMIN — OXYCODONE 5 MG: 5 TABLET ORAL at 15:46

## 2022-05-15 RX ADMIN — Medication 10 ML: at 10:09

## 2022-05-15 RX ADMIN — Medication 5 MG: at 00:53

## 2022-05-15 RX ADMIN — Medication 220 MG: at 10:08

## 2022-05-15 RX ADMIN — Medication 100 MG: at 10:08

## 2022-05-15 RX ADMIN — PIPERACILLIN AND TAZOBACTAM 3.38 G: 3; .375 INJECTION, POWDER, LYOPHILIZED, FOR SOLUTION INTRAVENOUS at 10:08

## 2022-05-15 RX ADMIN — TRAZODONE HYDROCHLORIDE 50 MG: 50 TABLET ORAL at 20:45

## 2022-05-15 RX ADMIN — SODIUM CHLORIDE, POTASSIUM CHLORIDE, SODIUM LACTATE AND CALCIUM CHLORIDE 100 ML/HR: 600; 310; 30; 20 INJECTION, SOLUTION INTRAVENOUS at 23:05

## 2022-05-15 RX ADMIN — NICOTINE 1 PATCH: 14 PATCH, EXTENDED RELEASE TRANSDERMAL at 20:46

## 2022-05-15 RX ADMIN — SODIUM CHLORIDE, POTASSIUM CHLORIDE, SODIUM LACTATE AND CALCIUM CHLORIDE 100 ML/HR: 600; 310; 30; 20 INJECTION, SOLUTION INTRAVENOUS at 13:53

## 2022-05-15 NOTE — PROGRESS NOTES
General Surgery Progress Note    Name: Grace Birmingham ADMIT: 2022   : 1965  PCP: Provider, No Known    MRN: [id number] LOS: 2 days   AGE/SEX: 56 y.o. female  ROOM: [address] [place] [address]    Chief Complaint   Patient presents with   • Jaundice     Pt c/o yellow eyes, fatigue since last week     Subjective     56 y.o. female admitted with right upper quadrant abdominal symptoms jaundice significant elevation in LFTs and bilirubin found to have hepatitis    In general feeling may be some better still pretty tired not having much pain.  No new fevers or chills.    Objective     Scheduled Medications:   nicotine, 1 patch, Transdermal, Q24H  [START ON 2022] pantoprazole, 40 mg, Oral, Q AM  sodium chloride, 10 mL, Intravenous, Q12H  thiamine, 100 mg, Oral, Daily  traZODone, 50 mg, Oral, Nightly  zinc sulfate, 220 mg, Oral, Daily        Active Infusions:  lactated ringers, 100 mL/hr, Last Rate: 100 mL/hr (05/15/22 1353)        As Needed Medications:  •  acetaminophen **OR** acetaminophen **OR** acetaminophen  •  aluminum-magnesium hydroxide-simethicone  •  ipratropium-albuterol  •  melatonin  •  nitroglycerin  •  ondansetron **OR** ondansetron  •  oxyCODONE  •  [COMPLETED] Insert peripheral IV **AND** sodium chloride  •  sodium chloride    Vital Signs  Vital Signs Patient Vitals for the past 24 hrs:   BP Temp Temp src Pulse Resp SpO2   05/15/22 1609 149/93 97.6 °F (36.4 °C) -- 69 -- 98 %   05/15/22 0807 108/69 -- -- 70 14 96 %   05/15/22 0525 100/74 98.2 °F (36.8 °C) Oral 77 16 96 %   22 2110 -- 98.2 °F (36.8 °C) Oral 75 20 97 %   22 1756 131/80 97.9 °F (36.6 °C) -- 83 -- 97 %       Physical Exam:  Physical Exam  Constitutional:       Appearance: Normal appearance.   Eyes:      General: Scleral icterus present.         Right eye: No discharge.         Left eye: No discharge.   Cardiovascular:      Rate and Rhythm: Normal rate.   Pulmonary:      Effort: Pulmonary effort is normal.   Abdominal:       General: There is no distension.   Skin:     General: Skin is warm and dry.      Coloration: Skin is jaundiced.   Neurological:      General: No focal deficit present.      Mental Status: She is alert. Mental status is at baseline.         Results Review:     CBC    Results from last 7 days   Lab Units 05/15/22  1038 05/14/22  0200 05/13/22  1539   WBC 10*3/mm3 4.90 6.10 5.90   HEMOGLOBIN g/dL 11.5* 11.8* 12.8   PLATELETS 10*3/mm3 241 272 308     CMP   Results from last 7 days   Lab Units 05/15/22  1038 05/14/22  0200 05/13/22  1539   SODIUM mmol/L 137 135* 137   POTASSIUM mmol/L 3.9 3.9 4.1   CHLORIDE mmol/L 103 100 102   CO2 mmol/L 24.0 23.0 26.0   BUN mg/dL 9 9 10   CREATININE mg/dL 0.58 0.56* 0.61   GLUCOSE mg/dL 142* 84 99   ALBUMIN g/dL 2.80* 3.10* 3.40*   BILIRUBIN mg/dL 13.7* 14.3* 14.6*   ALK PHOS U/L 251* 272* 289*   AST (SGOT) U/L 1,887* 2,095* 1,901*   ALT (SGPT) U/L 2,088* 2,097* 1,922*   AMYLASE U/L  --   --  87   LIPASE U/L  --   --  29   AMMONIA umol/L  --   --  18     Radiology(recent) CT Abdomen Pelvis With Contrast    Result Date: 5/13/2022  1. Cholelithiasis with dilated gallbladder which may relate to cholecystitis, correlate with associated clinical findings. 2. Common bile duct dilated up to 7 mm with suspicion of a small amount of debris in the proximal CBD concerning for choledocholithiasis. This could be confirmed with MRCP. 3. Prominent dilated pelvic venous vessels asymmetric to the left abutting the uterus which are nonspecific but can be seen with pelvic venous congestion. 4. Small left pleural effusion.     Electronically Signed By-Flo Sylvester MD On:5/13/2022 6:36 PM This report was finalized on [id number] by  Flo Sylvester MD.    MRI abdomen w wo contrast mrcp    Result Date: 5/14/2022  IMPRESSION : No evidence of choledocholithiasis or biliary obstruction. Normal diameter of the common bile duct.  Solitary 9 mm gallstone, nonobstructing. No evidence of cholecystitis.  Diffuse  periportal edema, nonspecific. This could relate to hepatitis or hepatic congestion as primary differential considerations.  Electronically Signed By-Mulugeta Aguillon On:5/14/2022 9:36 AM This report was finalized on [id number] by  Mulugeta Aguillon, .      I reviewed the patient's new clinical results.  Discussed with medicine  Assessment & Plan       Hepatitis    Hyperbilirubinemia    Tobacco abuse    Amphetamine abuse (HCC)    Jaundice      56 y.o. female with upper abdominal symptoms jaundice possible hepatitis    Hepatitis seems to be more likely to explain her symptoms then the cholelithiasis.  At this point cholecystectomy is not indicated.  Will defer to the outpatient setting for more counseling and/or consideration of removal of the gallbladder if it is symptomatic.     Follow-up as needed in the office        This note was created using Dragon Voice Recognition software.    Manjinder Pickering MD  05/15/22  16:44 EDT

## 2022-05-15 NOTE — PROGRESS NOTES
[facility] Medicine Services Daily Progress Note    Patient Name: Grace Birmingham  : 1965  MRN: [id number]  Primary Care Physician:  Provider, No Known  Date of admission: 2022      Subjective      Chief Complaint: Jaundice      Patient Reports     22: Claims 1 week history of fatigue, jaundice and diarrhea.  NPO.  GI consulted.  Denies excessive alcohol or Tylenol use.    5/15/22: Tolerating diet.  AST and ALT still elevated.  Hepatitis B&C positive.  Awaiting quantitative PCR.    Review of Systems   All other systems reviewed and are negative.         Objective      Vitals:   Temp:  [97.9 °F (36.6 °C)-98.2 °F (36.8 °C)] 98.2 °F (36.8 °C)  Heart Rate:  [70-83] 70  Resp:  [14-20] 14  BP: (100-131)/(69-80) 108/69    Physical Exam  HENT:      Head: Normocephalic.      Nose: Nose normal.   Eyes:      Extraocular Movements: Extraocular movements intact.      Pupils: Pupils are equal, round, and reactive to light.   Cardiovascular:      Rate and Rhythm: Normal rate and regular rhythm.   Pulmonary:      Effort: Pulmonary effort is normal.   Abdominal:      General: Bowel sounds are normal.   Musculoskeletal:         General: Normal range of motion.      Cervical back: Normal range of motion.   Skin:     General: Skin is warm.      Coloration: Skin is jaundiced.   Neurological:      General: No focal deficit present.      Mental Status: She is alert.   Psychiatric:         Mood and Affect: Mood normal.             Result Review    Result Review:  I have personally reviewed the results from the time of this admission to 5/15/2022 12:07 EDT and agree with these findings:  [x]  Laboratory  []  Microbiology  [x]  Radiology  []  EKG/Telemetry   []  Cardiology/Vascular   []  Pathology  [x]  Old records  []  Other:            Assessment & Plan      Brief Patient Summary:    56-year-old female with painless jaundice      nicotine, 1 patch, Transdermal, Q24H  pantoprazole, 40 mg,  Intravenous, Q AM  piperacillin-tazobactam, 3.375 g, Intravenous, Q8H  sodium chloride, 10 mL, Intravenous, Q12H  thiamine, 100 mg, Oral, Daily  traZODone, 50 mg, Oral, Nightly  zinc sulfate, 220 mg, Oral, Daily       lactated ringers, 100 mL/hr, Last Rate: 100 mL/hr (05/14/22 1131)         Active Hospital Problems:  Active Hospital Problems    Diagnosis    • **Hepatitis    • Jaundice    • Hyperbilirubinemia    • Tobacco abuse    • Amphetamine abuse (HCC)      Acute hepatitis from suspected viral hepatitis:  -s/p CT abdomen pelvis 5/13/22  -MRCP 5/14/22--> 9 mm nonobstructing gallstone.  No cholecystitis.  Diffuse periportal edema.  Normal CBD.   -GI consulted--> acute hepatitis work-up lab work pending  -General surgery consulted:--> Ruled out cholecystitis.  Ruled out choledocholithiasis  -Acute viral hepatitis panel--> hepatitis B IgM and hepatitis C antibody positive    Methamphetamine abuse:  -Counseled on cessation.  -Claims past use of IVDA many years ago    Tobacco abuse:  -Continue nicotine patch    DVT prophylaxis:  No DVT prophylaxis order currently exists.    CODE STATUS:    Level Of Support Discussed With: Patient  Code Status (Patient has no pulse and is not breathing): CPR (Attempt to Resuscitate)  Medical Interventions (Patient has pulse or is breathing): Full Support      Disposition:  I expect patient to be discharged defer.    This patient has been examined wearing appropriate Personal Protective Equipment and discussed with nursing. 05/15/22      Electronically signed by Jean Pierre Bowman DO, 05/15/22, 12:07 EDT.  [facility] Hospitalist Team

## 2022-05-15 NOTE — PROGRESS NOTES
" LOS: 2 days   Patient Care Team:  Provider, No Known as PCP - General      Subjective   \"I need to get home to take care of my mother's pets.\"    Interval History:   Refused morning labs.  Reordered.  Labs: Hepatitis B and C antibodies body positive.  Creatinine 0.58.  TB down to 13.7, AST improved at 1887, ALT about the same at 2088, alk phos down to 251.  Magnesium 1.6.  INR 1.65. WBCs 4.9, hemoglobin 11.5, platelets 241.  Feeling somewhat better today.  Tolerating diet.    ROS:   Jaundice.  Fatigue.  No chest pain, shortness of breath, or cough.       Medication Review:     Current Facility-Administered Medications:   •  acetaminophen (TYLENOL) tablet 500 mg, 500 mg, Oral, Q6H PRN **OR** acetaminophen (TYLENOL) 160 MG/5ML solution 500 mg, 500 mg, Oral, Q6H PRN **OR** acetaminophen (TYLENOL) suppository 325 mg, 325 mg, Rectal, Q6H PRN, Kris Ramey MD  •  aluminum-magnesium hydroxide-simethicone (MAALOX MAX) 400-400-40 MG/5ML suspension 15 mL, 15 mL, Oral, Q6H PRN, Kris Ramey MD  •  ipratropium-albuterol (DUO-NEB) nebulizer solution 3 mL, 3 mL, Nebulization, Q6H PRN, Kris Ramey MD  •  lactated ringers infusion, 100 mL/hr, Intravenous, Continuous, Kris Ramey MD, Last Rate: 100 mL/hr at 05/14/22 1131, 100 mL/hr at 05/14/22 1131  •  melatonin tablet 5 mg, 5 mg, Oral, Nightly PRN, Kris Ramey MD, 5 mg at 05/15/22 0053  •  nicotine (NICODERM CQ) 14 MG/24HR patch 1 patch, 1 patch, Transdermal, Q24H, Kris Ramey MD, 1 patch at 05/14/22 2153  •  nitroglycerin (NITROSTAT) SL tablet 0.4 mg, 0.4 mg, Sublingual, Q5 Min PRN, Kris Ramey MD  •  ondansetron (ZOFRAN) tablet 4 mg, 4 mg, Oral, Q6H PRN **OR** ondansetron (ZOFRAN) injection 4 mg, 4 mg, Intravenous, Q6H PRN, Kris Ramey MD  •  pantoprazole (PROTONIX) injection 40 mg, 40 mg, Intravenous, Q AM, Kris Ramey MD, 40 mg at 05/15/22 0513  •  piperacillin-tazobactam (ZOSYN) IVPB 3.375 g in 100 mL NS (CD), 3.375 g, Intravenous, " Q8H, Sonia Stockton APRN, 3.375 g at 05/15/22 1008  •  [COMPLETED] Insert peripheral IV, , , Once **AND** sodium chloride 0.9 % flush 10 mL, 10 mL, Intravenous, PRN, Jenna Harvey APRN, 10 mL at 05/13/22 1537  •  sodium chloride 0.9 % flush 10 mL, 10 mL, Intravenous, Q12H, Kris Ramey MD, 10 mL at 05/15/22 1009  •  sodium chloride 0.9 % flush 10 mL, 10 mL, Intravenous, PRN, Kris Ramey MD  •  thiamine (VITAMIN B-1) tablet 100 mg, 100 mg, Oral, Daily, Sonia Stockton APRN, 100 mg at 05/15/22 1008  •  traZODone (DESYREL) tablet 50 mg, 50 mg, Oral, Nightly, Kris Ramey MD, 50 mg at 05/14/22 2151  •  zinc sulfate (ZINCATE) capsule 220 mg, 220 mg, Oral, Daily, Sonia Stockton APRN, 220 mg at 05/15/22 1008      Objective  Resting hospital bed.  Somewhat less jaundiced today.    Vital Signs  Temp:  [97.9 °F (36.6 °C)-98.2 °F (36.8 °C)] 98.2 °F (36.8 °C)  Heart Rate:  [70-83] 70  Resp:  [14-20] 14  BP: (100-131)/(69-80) 108/69  Physical Exam:    General Appearance:    Awake and alert, in no acute distress   Head:    Normocephalic, without obvious abnormality   Eyes:          Conjunctivae normal, icteric sclerae   Ears:    Hearing intact   Throat:   No oral lesions, no thrush, oral mucosa moist   Neck:   No adenopathy, supple, no JVD   Lungs:      respirations regular, even and unlabored       Abdomen:     Normal bowel sounds, soft, non-tender, no rebound or guarding, non-distended, no hepatosplenomegaly   Rectal:     Deferred   Extremities:   No edema, no cyanosis, no redness   Skin:   No bleeding, bruising or rash, + jaundice   Neurologic:   Cranial nerves 2 - 12 grossly intact, no asterixis, sensation   intact        Results Review:    CBC    Results from last 7 days   Lab Units 05/15/22  1038 05/14/22  0200 05/13/22  1539   WBC 10*3/mm3 4.90 6.10 5.90   HEMOGLOBIN g/dL 11.5* 11.8* 12.8   PLATELETS 10*3/mm3 241 272 308     CMP   Results from last 7 days   Lab Units 05/15/22  1038 05/14/22  0200  05/13/22  1539   SODIUM mmol/L 137 135* 137   POTASSIUM mmol/L 3.9 3.9 4.1   CHLORIDE mmol/L 103 100 102   CO2 mmol/L 24.0 23.0 26.0   BUN mg/dL 9 9 10   CREATININE mg/dL 0.58 0.56* 0.61   GLUCOSE mg/dL 142* 84 99   ALBUMIN g/dL 2.80* 3.10* 3.40*   BILIRUBIN mg/dL 13.7* 14.3* 14.6*   ALK PHOS U/L 251* 272* 289*   AST (SGOT) U/L 1,887* 2,095* 1,901*   ALT (SGPT) U/L 2,088* 2,097* 1,922*   MAGNESIUM mg/dL 1.6 1.8 1.7   AMYLASE U/L  --   --  87   LIPASE U/L  --   --  29   AMMONIA umol/L  --   --  18     Cr Clearance Estimated Creatinine Clearance: 92.3 mL/min (by C-G formula based on SCr of 0.58 mg/dL).  Coag   Results from last 7 days   Lab Units 05/15/22  1038 05/14/22  0200 05/13/22  1539   INR  1.65* 1.51* 1.48*   APTT seconds  --   --  36.4*     HbA1C No results found for: HGBA1C  Blood Glucose No results found for: POCGLU  Infection     UA    Results from last 7 days   Lab Units 05/13/22  1702   NITRITE UA  Negative   WBC UA /HPF 0-2*   BACTERIA UA /HPF None Seen   SQUAM EPITHEL UA /HPF 0-2     Radiology(recent) CT Abdomen Pelvis With Contrast    Result Date: 5/13/2022  1. Cholelithiasis with dilated gallbladder which may relate to cholecystitis, correlate with associated clinical findings. 2. Common bile duct dilated up to 7 mm with suspicion of a small amount of debris in the proximal CBD concerning for choledocholithiasis. This could be confirmed with MRCP. 3. Prominent dilated pelvic venous vessels asymmetric to the left abutting the uterus which are nonspecific but can be seen with pelvic venous congestion. 4. Small left pleural effusion.     Electronically Signed By-Flo Sylvester MD On:5/13/2022 6:36 PM This report was finalized on [id number] by  Flo Sylvester MD.    MRI abdomen w wo contrast mrcp    Result Date: 5/14/2022  IMPRESSION : No evidence of choledocholithiasis or biliary obstruction. Normal diameter of the common bile duct.  Solitary 9 mm gallstone, nonobstructing. No evidence of cholecystitis.   Diffuse periportal edema, nonspecific. This could relate to hepatitis or hepatic congestion as primary differential considerations.  Electronically Signed By-Mulugeta Aguillon On:5/14/2022 9:36 AM This report was finalized on [id number] by  Mulugeta Aguillon, .            Assessment & Plan     Acute biliary hepatitis consider related to Hep B & C   Elevated LFTs due to above  Abnormal CT indicating cholelithiasis with dilated biliary duct of 7 mm  Right upper quadrant abdominal pain consider above  Tobacco abuse  Meth usage  Alcohol usage  Hyponatremia  Mild normocytic anemia  Elevated INR consider related to hepatitis  Recent tick bite  GERD     PLAN:  Hepatitis B and C came back positive yesterday.  Pending viral loads.  INR is increasing.  Would recommend she stay another day at least until we start seeing a downward trend.  Pending TAD, anti-smooth muscle antibody, autoimmune markers/IgG.  Pending CMV and Xavi-Barr and tick panel.  Continue IV PPI  Zosyn has been discontinued.  Encouraged good nutrition with supplements.  Antiemetic and pain medications as needed  Hold Lovenox for pending ERCP versus cholecystectomy  Recommend tobacco, alcohol and meth cessation  Continue zinc and thiamine.  Supportive care in the interim      Sonia Stockton, APRN  05/15/22  12:03 EDT

## 2022-05-15 NOTE — PLAN OF CARE
Goal Outcome Evaluation:  Plan of Care Reviewed With: patient        Progress: no change  Outcome Evaluation: GI watching patient INR. Patient asked for PRN pain medication x1. Patient verbalizes eagerness to discharge as soon possible. Patient in bed with call light in reach

## 2022-05-15 NOTE — PLAN OF CARE
Goal Outcome Evaluation:  Plan of Care Reviewed With: patient        Progress: no change     Pt expressed frustration about being away from her pets, home and mentioned bills. Pt also mentioned lack of support. Pt declined CM or SW at this time. Pt has no indication of SI/HI indications.  No other issues addressed at this time. Will continue to monitor.

## 2022-05-16 ENCOUNTER — INPATIENT HOSPITAL (OUTPATIENT)
Dept: URBAN - METROPOLITAN AREA HOSPITAL 84 | Facility: HOSPITAL | Age: 57
End: 2022-05-16

## 2022-05-16 DIAGNOSIS — F10.10 ALCOHOL ABUSE, UNCOMPLICATED: ICD-10-CM

## 2022-05-16 DIAGNOSIS — K80.50 CALCULUS OF BILE DUCT WITHOUT CHOLANGITIS OR CHOLECYSTITIS W: ICD-10-CM

## 2022-05-16 DIAGNOSIS — F15.11 OTHER STIMULANT ABUSE, IN REMISSION: ICD-10-CM

## 2022-05-16 DIAGNOSIS — D64.89 OTHER SPECIFIED ANEMIAS: ICD-10-CM

## 2022-05-16 DIAGNOSIS — B17.9 ACUTE VIRAL HEPATITIS, UNSPECIFIED: ICD-10-CM

## 2022-05-16 DIAGNOSIS — R74.8 ABNORMAL LEVELS OF OTHER SERUM ENZYMES: ICD-10-CM

## 2022-05-16 DIAGNOSIS — R10.84 GENERALIZED ABDOMINAL PAIN: ICD-10-CM

## 2022-05-16 LAB
ALBUMIN SERPL-MCNC: 2.7 G/DL (ref 3.5–5.2)
ALBUMIN/GLOB SERPL: 0.9 G/DL
ALP SERPL-CCNC: 230 U/L (ref 39–117)
ALT SERPL W P-5'-P-CCNC: 1912 U/L (ref 1–33)
ANA SER QL: NEGATIVE
ANION GAP SERPL CALCULATED.3IONS-SCNC: 10 MMOL/L (ref 5–15)
AST SERPL-CCNC: 1513 U/L (ref 1–32)
BASOPHILS # BLD AUTO: 0 10*3/MM3 (ref 0–0.2)
BASOPHILS NFR BLD AUTO: 0.7 % (ref 0–1.5)
BILIRUB SERPL-MCNC: 12 MG/DL (ref 0–1.2)
BUN SERPL-MCNC: 7 MG/DL (ref 6–20)
BUN/CREAT SERPL: 10.1 (ref 7–25)
CALCIUM SPEC-SCNC: 8.6 MG/DL (ref 8.6–10.5)
CHLORIDE SERPL-SCNC: 105 MMOL/L (ref 98–107)
CO2 SERPL-SCNC: 23 MMOL/L (ref 22–29)
CREAT SERPL-MCNC: 0.69 MG/DL (ref 0.57–1)
DEPRECATED RDW RBC AUTO: 53.8 FL (ref 37–54)
EBV EARLY AG: NEGATIVE
EBV NUCLEAR AG: POSITIVE
EBV VCA IGG: POSITIVE
EBV VCA IGM: NEGATIVE
EGFRCR SERPLBLD CKD-EPI 2021: 100.7 ML/MIN/1.73
EOSINOPHIL # BLD AUTO: 0.2 10*3/MM3 (ref 0–0.4)
EOSINOPHIL NFR BLD AUTO: 2.2 % (ref 0.3–6.2)
ERYTHROCYTE [DISTWIDTH] IN BLOOD BY AUTOMATED COUNT: 17.3 % (ref 12.3–15.4)
GLOBULIN UR ELPH-MCNC: 3.1 GM/DL
GLUCOSE SERPL-MCNC: 114 MG/DL (ref 65–99)
HCT VFR BLD AUTO: 33.9 % (ref 34–46.6)
HGB BLD-MCNC: 11 G/DL (ref 12–15.9)
IGG4 SER-MCNC: 18 MG/DL (ref 2–96)
INR PPP: 1.55 (ref 0.93–1.1)
LYMPHOCYTES # BLD AUTO: 1.4 10*3/MM3 (ref 0.7–3.1)
LYMPHOCYTES NFR BLD AUTO: 19.8 % (ref 19.6–45.3)
MCH RBC QN AUTO: 29.1 PG (ref 26.6–33)
MCHC RBC AUTO-ENTMCNC: 32.4 G/DL (ref 31.5–35.7)
MCV RBC AUTO: 90 FL (ref 79–97)
MITOCHONDRIA M2 IGG SER-ACNC: <20 UNITS (ref 0–20)
MONOCYTES # BLD AUTO: 1.3 10*3/MM3 (ref 0.1–0.9)
MONOCYTES NFR BLD AUTO: 18.6 % (ref 5–12)
NEUTROPHILS NFR BLD AUTO: 4.1 10*3/MM3 (ref 1.7–7)
NEUTROPHILS NFR BLD AUTO: 58.7 % (ref 42.7–76)
NRBC BLD AUTO-RTO: 0.2 /100 WBC (ref 0–0.2)
PLATELET # BLD AUTO: 240 10*3/MM3 (ref 140–450)
PMV BLD AUTO: 10.7 FL (ref 6–12)
POTASSIUM SERPL-SCNC: 3.9 MMOL/L (ref 3.5–5.2)
PROT SERPL-MCNC: 5.8 G/DL (ref 6–8.5)
PROTHROMBIN TIME: 15.6 SECONDS (ref 9.6–11.7)
RBC # BLD AUTO: 3.76 10*6/MM3 (ref 3.77–5.28)
SMA IGG SER-ACNC: 26 UNITS (ref 0–19)
SODIUM SERPL-SCNC: 138 MMOL/L (ref 136–145)
WBC NRBC COR # BLD: 6.9 10*3/MM3 (ref 3.4–10.8)

## 2022-05-16 PROCEDURE — 99231 SBSQ HOSP IP/OBS SF/LOW 25: CPT | Performed by: INTERNAL MEDICINE

## 2022-05-16 PROCEDURE — 85610 PROTHROMBIN TIME: CPT | Performed by: NURSE PRACTITIONER

## 2022-05-16 PROCEDURE — 85025 COMPLETE CBC W/AUTO DIFF WBC: CPT | Performed by: HOSPITALIST

## 2022-05-16 PROCEDURE — 99233 SBSQ HOSP IP/OBS HIGH 50: CPT | Performed by: NURSE PRACTITIONER

## 2022-05-16 PROCEDURE — 0 PHYTONADIONE 10 MG/ML SOLUTION: Performed by: NURSE PRACTITIONER

## 2022-05-16 PROCEDURE — 80053 COMPREHEN METABOLIC PANEL: CPT | Performed by: HOSPITALIST

## 2022-05-16 RX ORDER — PHYTONADIONE 2 MG/ML
5 INJECTION, EMULSION INTRAMUSCULAR; INTRAVENOUS; SUBCUTANEOUS ONCE
Status: COMPLETED | OUTPATIENT
Start: 2022-05-16 | End: 2022-05-16

## 2022-05-16 RX ADMIN — Medication 100 MG: at 09:26

## 2022-05-16 RX ADMIN — Medication 10 ML: at 09:27

## 2022-05-16 RX ADMIN — TRAZODONE HYDROCHLORIDE 50 MG: 50 TABLET ORAL at 20:46

## 2022-05-16 RX ADMIN — PHYTONADIONE 5 MG: 10 INJECTION, EMULSION INTRAMUSCULAR; INTRAVENOUS; SUBCUTANEOUS at 12:13

## 2022-05-16 RX ADMIN — SODIUM CHLORIDE, POTASSIUM CHLORIDE, SODIUM LACTATE AND CALCIUM CHLORIDE 100 ML/HR: 600; 310; 30; 20 INJECTION, SOLUTION INTRAVENOUS at 16:30

## 2022-05-16 RX ADMIN — Medication 220 MG: at 09:26

## 2022-05-16 RX ADMIN — Medication 5 MG: at 01:58

## 2022-05-16 RX ADMIN — NICOTINE 1 PATCH: 14 PATCH, EXTENDED RELEASE TRANSDERMAL at 20:50

## 2022-05-16 RX ADMIN — PANTOPRAZOLE SODIUM 40 MG: 40 TABLET, DELAYED RELEASE ORAL at 05:06

## 2022-05-16 NOTE — CONSULTS
"Nutrition Services    Patient Name: Grace Birmingham  YOB: 1965  MRN: [id number]  Admission date: 5/13/2022    Continue diet and ONS as tolerated, which are meeting needs.    PPE Documentation        PPE Worn By Provider mask, gloves and eye protection   PPE Worn By Patient  None     CLINICAL NUTRITION ASSESSMENT      Reason for Assessment 5/16: MST score 2+     H&P  Pt presented to hospital with acute hepatitis and jaundice.    Past Medical History:   Diagnosis Date   • Anemia    • Arthritis    • Asthma    • Depression    • History of transfusion    • Kidney stone    • Pneumonia        Past Surgical History:   Procedure Laterality Date   • ABDOMINAL SURGERY N/A     Appendectomy, bilateral salpingectomy, bowel resection   • APPENDECTOMY          Current Problems   Acute hepatitis from suspected viral hepatitis:  -s/p CT abdomen pelvis 5/13/22  -MRCP 5/14/22--> 9 mm nonobstructing gallstone.  No cholecystitis.  Diffuse periportal edema.  Normal CBD.   -GI consulted--> acute hepatitis work-up lab work pending  -General surgery consulted:--> Ruled out cholecystitis.  Ruled out choledocholithiasis  -Acute viral hepatitis panel--> hepatitis B IgM and hepatitis C antibody positive     Encounter Information        Trending Narrative     5/16: Pt assessed due to concern for possible weight loss. Pt unsure of weight Hx, but NFPE completed and not consistent with nutrition diagnosis of malnutrition at this time using AND/ASPEN criteria. Eating/tolerating PO with improving intake. Current diet meeting estimated needs.     Anthropometrics        Current Height, Weight Height: 157.5 cm (62\")  Weight: 63.1 kg (139 lb 1.8 oz) (05/13/22 2209)       Ideal Body Weight (IBW) 110 lb   Usual Body Weight (UBW) UTD       Trending Weight Hx     This admission: 5/16: Scale weight 63.1 kg so far this admission              PTA: 5/16: No extended weight Hx on file    Wt Readings from Last 30 Encounters:   05/13/22 2209 63.1 kg " (139 lb 1.8 oz)   05/13/22 1413 63.1 kg (139 lb 1.8 oz)      BMI kg/m2 Body mass index is 25.44 kg/m².       Labs        Pertinent Labs    Results from last 7 days   Lab Units 05/16/22  0142 05/15/22  1038 05/14/22  0200   SODIUM mmol/L 138 137 135*   POTASSIUM mmol/L 3.9 3.9 3.9   CHLORIDE mmol/L 105 103 100   CO2 mmol/L 23.0 24.0 23.0   BUN mg/dL 7 9 9   CREATININE mg/dL 0.69 0.58 0.56*   CALCIUM mg/dL 8.6 8.7 9.2   BILIRUBIN mg/dL 12.0* 13.7* 14.3*   ALK PHOS U/L 230* 251* 272*   ALT (SGPT) U/L 1,912* 2,088* 2,097*   AST (SGOT) U/L 1,513* 1,887* 2,095*   GLUCOSE mg/dL 114* 142* 84     Results from last 7 days   Lab Units 05/16/22  0142 05/15/22  1038 05/14/22  0200 05/13/22  1539   MAGNESIUM mg/dL  --  1.6 1.8 1.7   HEMOGLOBIN g/dL 11.0* 11.5* 11.8* 12.8   HEMATOCRIT % 33.9* 34.4 35.4 38.7     COVID19   Date Value Ref Range Status   05/13/2022 Not Detected Not Detected - Ref. Range Final     No results found for: HGBA1C     Medications    Scheduled Medications nicotine, 1 patch, Transdermal, Q24H  pantoprazole, 40 mg, Oral, Q AM  sodium chloride, 10 mL, Intravenous, Q12H  thiamine, 100 mg, Oral, Daily  traZODone, 50 mg, Oral, Nightly  zinc sulfate, 220 mg, Oral, Daily        Infusions lactated ringers, 100 mL/hr, Last Rate: 100 mL/hr (05/15/22 2305)        PRN Medications •  acetaminophen **OR** acetaminophen **OR** acetaminophen  •  aluminum-magnesium hydroxide-simethicone  •  ipratropium-albuterol  •  melatonin  •  nitroglycerin  •  ondansetron **OR** ondansetron  •  oxyCODONE  •  [COMPLETED] Insert peripheral IV **AND** sodium chloride  •  sodium chloride     Physical Findings        Trending Physical   Appearance, NFPE 5/16: NFPE completed and not consistent with nutrition diagnosis of malnutrition at this time using AND/ASPEN criteria      --  Edema  None documented      Bowel Function BM 5/15     Tubes No feeding tube in place      Chewing/Swallowing No problems documented      Skin No breakdown documented       --  Current Nutrition Orders & Evaluation of Intake       Oral Nutrition     Food Allergies NKFA   Current PO Diet Diet Regular   Supplement None ordered    PO Evaluation     Trending % PO Intake 5/16: 100% intake at all recent meals    --  Nutritional Risk Screening        NRS-2002 Score          Nutrition Diagnosis         Nutrition Dx Problem 1 No nutrition Dx identified at this time; will continue to follow to determine if Dx arises. Current nutrition interventions are meeting needs.      Nutrition Dx Problem 2        Intervention Goal         Intervention Goal(s) Intake 75% or better at meals   Stable weight      Nutrition Intervention        RD Action Will continue diet as tolerated      Nutrition Prescription          Diet Prescription Regular    Supplement Prescription None ordered    --  Monitor/Evaluation        Monitor I&O, PO intake, Pertinent labs, Weight, Skin status, GI status, POC/GOC     Electronically signed by:  Syeda Delcid RD  05/16/22 14:07 EDT

## 2022-05-16 NOTE — CASE MANAGEMENT/SOCIAL WORK
Social Work Assessment  [facility]     Patient Name: Grace Birmingham  MRN: [id number]  Today's Date: 5/16/2022    Admit Date: 5/13/2022     Psychosocial     Row Name 05/16/22 8790       Coping/Stress    Major Change/Loss/Stressor relationship concerns    Coping/Stress Comments LSW met with patient at bedside regarding consult for: safety concern with patient significant other. Patient alone at bedside, but wouldn’t wake up long for much conversation. LSW confirmed patient lives at home with s/o and plans to return there, states she is safe at home, denies concerns. Attempted to discuss ACP (booklet left at bedside for review) and substance use, but patient declined at this time. Will follow.           Met with patient in room wearing PPE: mask.      Maintained distance greater than six feet and spent less than 15 minutes in the room.    ELIZABETH Ga    Phone: [phone]  Cell: [phone]  Fax: [phone]  [email or web address]

## 2022-05-16 NOTE — PAYOR COMM NOTE
"[place] [employer] [place] form attached.     Inpatient order 5/13/22  ER admit   Acute Hepatitis B with bilirubin of 14.   MD notes and clinical attached.   ==========  AUTHORIZATION PENDING:   PLEASE FAX OR CALL DETERMINATION TO CONTACT BELOW:     THANK YOU,    WESTLEY SharmaN, RN  Utilization Review  [facility]  Phone: [phone]  Fax: [phone]      NPI: [id number]  Tax ID: [id number]    Grace Fernando (56 y.o. Female)             Date of Birth   1965    Social Security Number       Address   [address] [place] [address] [place] [facility]    Home Phone   [phone]    MRN   [id number]       Oriental orthodox   None    Marital Status   Single                            Admission Date   5/13/22    Admission Type   Emergency    Admitting Provider   Eleonora Plummer MD    Attending Provider   Eleonora Plummer MD    Department, Room/Bed   [facility] 3A MEDICAL INPATIENT, 312/1       Discharge Date       Discharge Disposition       Discharge Destination                               Attending Provider: Eleonora Plummer MD    Allergies: No Known Allergies    Isolation: None   Infection: None   Code Status: CPR   Advance Care Planning Activity    Ht: 157.5 cm (62\")   Wt: 63.1 kg (139 lb 1.8 oz)    Admission Cmt: None   Principal Problem: Hepatitis [K75.9]                 Active Insurance as of 5/13/2022     Primary Coverage     Payor Plan Insurance Group Employer/Plan Group    [place] MEDICA[employer] [facility] [id number]     Payor Plan Address Payor Plan Phone Number Payor Plan Fax Number Effective Dates    MAIL STOP:   3/1/2019 - None Entered    [address] [address]       [facility] [id number]       Subscriber Name Subscriber Birth Date Member ID       GRACE FERNANDO 1965 [id number]                 Emergency Contacts      (Rel.) Home Phone Work Phone Mobile Phone    DOTTIE MCKENZIE (Significant Other) [phone] -- [phone]               History & Physical    "   Kris Ramey MD at 22              [facility] Medicine Services      Patient Name: Grace Birmingham  : 1965  MRN: [id number]  Primary Care Physician:  Provider, No Known  Date of admission: 2022      Subjective      Chief Complaint: Jaundice    History of Present Illness: Grace Birmingham is a 56 y.o. female who presented to [facility] on 2022 complaining of jaundiced.  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  She does not see a doctor regularly but she has not been diagnosed with any medical problems in the past and she is currently on no medications.  The patient states that she does smoke about a pack of cigarettes a day and periodically uses methamphetamine but she smokes.  The last time she smoked was 2 to 3 days ago.  The reason she came today she looked in the mirror and saw that her skin had a very yellow tinge color and her eyes were yellow was well.  She came to the emergency room.  She is a little bit of nausea but no vomiting no diarrhea and no real abdominal pain.  In the emergency room she had severely elevated liver enzymes with an AST of 1900 and an ALT of 1900 along with a total bilirubin mostly direct of 14.6.  CT scan showed some biliary dilatation and gallbladder dilatation but no clear stone.  She states that she does not drink alcohol, has been taking a lot of energy drinks lately, and takes an occasional ibuprofen and Tylenol but no more than is recommended per the bottle and not every day.  We will admit the patient for acute hepatitis and evaluate for possible autoimmune causes while investigating possible biliary disease as well.      ROS jaundice, decreased appetite, fatigue, did have some URI symptoms a few days ago, slight abdominal distention, nausea but no vomiting and no diarrhea or constipation, she denies shortness  of breath, fever, chills, diarrhea or constipation, does report some mild peripheral edema, denies rash, altered mental status, periodic headaches but no different from her baseline, denies increased mucus production, shortness of breath, chest pain and the rest the 15 essential review of systems have been reviewed and are negative    Personal History     Past Medical History:   Diagnosis Date   • Anemia    • Arthritis    • Asthma    • Depression    • History of transfusion    • Kidney stone    • Pneumonia        Past Surgical History:   Procedure Laterality Date   • ABDOMINAL SURGERY N/A     Appendectomy, bilateral salpingectomy, bowel resection   • APPENDECTOMY         Family History: Her sister has breast cancer, both of her grandparents had diabetes on both sides of the family    Social History:  reports that she has been smoking cigarettes. She has a 40.00 pack-year smoking history. She does not have any smokeless tobacco history on file. She reports previous alcohol use. She reports previous drug use. Drugs: IV, Cocaine(coke), Marijuana, and Methamphetamines.  She states that she denies use of IV medications    Home Medications: She is not on any current home medications she does drink a lot of energy drinks  Prior to Admission Medications     None            Allergies:  No Known Allergies    Objective      Vitals:   Temp:  [97.8 °F (36.6 °C)] 97.8 °F (36.6 °C)  Heart Rate:  [79-90] 84  Resp:  [20] 20  BP: (129-154)/(72-91) 135/75    Physical Exam   General no distress  Head atraumatic  Eyes jaundice ocular motion intact pupils are reactive  Oropharynx membranes slightly dry no erythema jaundice was obvious  Neck no thyromegaly lymphadenopathy  Pulmonary lungs were clear to auscultation bilateral no accessory muscle use  Cardiac regular rate and rhythm could not appreciate any murmurs no edema  Abdomen mild distention there was no hepatosplenomegaly no guarding no rebound bowel sounds were present no pain on  palpation  Extremities symmetric slight edema warm to the touch able to move all extremities  Neuro cranial nerves II through XII intact and no obvious focal deficit  Psych patient was alert and oriented x4 answers questions appropriately appropriate mood and affect  Derm skin was jaundiced but did not perceive any rash    Result Review    Result Review:  I have personally reviewed the results from the time of this admission to 5/13/2022 20:30 EDT and agree with these findings:  [x]  Laboratory  [x]  Microbiology  [x]  Radiology  []  EKG/Telemetry   []  Cardiology/Vascular   []  Pathology  []  Old records  []  Other:  Most notable findings include:   Sodium 137, potassium 4.1, bicarb 26, creatinine 0.61, BUN 10, calcium 9, alkaline phosphatase 289, total protein 6.9, ALT 1900, AST 1900, total bilirubin 14.6, albumin 3.4, the bilirubin greater than 10 was direct  Ammonia level 18, amylase 87, lipase 29, magnesium 1.7  PTT 36.4, PT 14.9, INR 1.48  CBC White blood cells 5900, hemoglobin 12.8, platelets 308,000  Urine drug screen was positive for amphetamines  Monospot was negative Tylenol level was less than 5 undetectable    IMPRESSION:  1. Cholelithiasis with dilated gallbladder which may relate to  cholecystitis, correlate with associated clinical findings.  2. Common bile duct dilated up to 7 mm with suspicion of a small amount  of debris in the proximal CBD concerning for choledocholithiasis. This  could be confirmed with MRCP.  3. Prominent dilated pelvic venous vessels asymmetric to the left  abutting the uterus which are nonspecific but can be seen with pelvic  venous congestion.  4. Small left pleural effusion.  Assessment & Plan        Active Hospital Problems:  Active Hospital Problems    Diagnosis    • **Hepatitis    • Hyperbilirubinemia    • Tobacco abuse    • Amphetamine abuse (HCC)      Plan:   1.  Acute hepatitis -could be biliary in nature she does have cholelithiasis, MRCP has been ordered.  Also  could be acute hepatitis so hepatitis panel A, B, and C has been ordered.  Could be methamphetamine induced or other medications or over-the-counter medications.  But she does not state that she abuses alcohol or Tylenol or NSAIDs.  And looking for autoimmune so TAD, IgG4, antimitochondrial antibodies and anti-smooth muscle antibodies made the patient n.p.o. consulted general surgery and GI  2.  Hyperbilirubinemia see #1  3.  Tobacco abuse counseled her on cessation given nicotine patch  4.  Amphetamine abuse discussed the importance of abstinence    DVT prophylaxis:  Medical DVT prophylaxis orders are present.    CODE STATUS:    Level Of Support Discussed With: Patient  Code Status (Patient has no pulse and is not breathing): CPR (Attempt to Resuscitate)  Medical Interventions (Patient has pulse or is breathing): Full Support    Admission Status:  I believe this patient meets inpatient status.    I discussed the patient's findings and my recommendations with patient.    This patient has been examined wearing appropriate Personal Protective Equipment and discussed with . 05/13/22      Signature:     Electronically signed by Kris Ramey MD at 05/13/22 2049          Emergency Department Notes      Jenna Harvey APRN at 05/13/22 1518          Subjective   Chief Complaint: Jaundice      HPI: Patient is a 56-year-old female presents to the ER today for yellowing skin and eyes and intermittent right upper quadrant pain.  She denies that she has a history of alcoholism, does states she may drink 1-2 daiquiri's once a week. she does report she has used meth in the last 3 weeks.  She states that she noticed her symptoms approximately a week ago and they have progressively gotten worse.  She reports an increased anxiety and stress over the last several months as her mother and father passed away recently.  She has had several abdominal surgeries including ruptured appendix when she was 15 that required bowel  repair.    PCP: None          Review of Systems   Constitutional: Positive for fatigue. Negative for chills and fever.   HENT: Negative for sore throat and trouble swallowing.         Mouth swelling   Eyes: Negative for photophobia and visual disturbance.   Respiratory: Negative for cough and shortness of breath.    Cardiovascular: Negative for chest pain and palpitations.   Gastrointestinal: Positive for abdominal pain. Negative for blood in stool, constipation, diarrhea, nausea and vomiting.   Endocrine: Negative for polyuria.   Genitourinary: Negative for dysuria, flank pain and hematuria.   Musculoskeletal: Negative.    Skin: Positive for color change.   Neurological: Negative for dizziness, light-headedness and headaches.   Hematological: Negative.    Psychiatric/Behavioral: Negative.        Past Medical History:   Diagnosis Date   • Anemia    • Arthritis    • Asthma    • Depression    • History of transfusion    • Kidney stone    • Pneumonia        No Known Allergies    Past Surgical History:   Procedure Laterality Date   • ABDOMINAL SURGERY N/A     Appendectomy, bilateral salpingectomy, bowel resection   • APPENDECTOMY         History reviewed. No pertinent family history.    Social History     Socioeconomic History   • Marital status: Single   Tobacco Use   • Smoking status: Current Every Day Smoker     Packs/day: 1.00     Years: 40.00     Pack years: 40.00     Types: Cigarettes   Vaping Use   • Vaping Use: Never used   Substance and Sexual Activity   • Alcohol use: Not Currently   • Drug use: Not Currently     Types: IV, Cocaine(coke), Marijuana, Methamphetamines     Comment: Marijuana use last 3 weeks ago   • Sexual activity: Yes     Partners: Male     Birth control/protection: None           Objective   Physical Exam  Vitals reviewed.   Constitutional:       Appearance: She is not ill-appearing or toxic-appearing.   HENT:      Head: Normocephalic.      Mouth/Throat:      Mouth: Mucous membranes are moist.       Pharynx: Oropharynx is clear.   Eyes:      General: Lids are normal.      Extraocular Movements: Extraocular movements intact.      Pupils: Pupils are equal, round, and reactive to light.      Comments: Scleral icterus   Cardiovascular:      Rate and Rhythm: Normal rate and regular rhythm.      Pulses: Normal pulses.      Heart sounds: Normal heart sounds. No murmur heard.  Pulmonary:      Effort: Pulmonary effort is normal.      Breath sounds: Normal breath sounds. No wheezing.   Abdominal:      General: Bowel sounds are normal. There is no distension.      Palpations: Abdomen is soft. There is hepatomegaly.      Tenderness: There is no abdominal tenderness.   Musculoskeletal:         General: No tenderness. Normal range of motion.      Cervical back: Normal range of motion.   Skin:     General: Skin is warm and dry.      Coloration: Skin is jaundiced.      Findings: No bruising.   Neurological:      General: No focal deficit present.      Mental Status: She is alert and oriented to person, place, and time. Mental status is at baseline.      Motor: No weakness.   Psychiatric:         Attention and Perception: Attention normal.         Mood and Affect: Mood is anxious.         Speech: Speech normal.         Behavior: Behavior is cooperative.         Thought Content: Thought content normal.         Cognition and Memory: Cognition normal.         Judgment: Judgment normal.         Procedures          ED Course  ED Course as of 05/13/22 2021   Fri May 13, 2022   1453 Patient evaluated after being placed in a room from triage [BH]   1631 CT Abdomen Pelvis With Contrast  Pending testing  [BH]   1722 CT Abdomen Pelvis With Contrast  Pending testing.  [BH]   1722 Bilirubin, UA(!): Moderate (2+) [BH]   1723 Bilirubin, Direct(!): >10.0 [BH]   1723 ALT (SGPT)(!): 1,922 [BH]   1723 AST (SGOT)(!): 1,901 [BH]   1723 Alkaline Phosphatase(!): 289 [BH]   1723 Total Bilirubin(!): 14.6 [BH]   1723 Albumin(!): 3.40 [BH]   1737  "Amphet/Methamphet, Screen(!): Positive  Patient admitted to meth abuse approximately 3 weeks ago.  []   1740 CT Abdomen Pelvis With Contrast  Pending results []   1832 CT Abdomen Pelvis With Contrast  Pending results   []   1918 Patient reports last intake was approximately 1 hour ago with sips of a drink.  She dorsey not had a meal today.  []   1956 Spoke with Dr. Bautista with gastroenterology who advised that patient would need an TAD as well as a hepatic profile.  He requested that a PT/INR and CMP be completed in the morning as well as IV fluids.  Dr. Ramey was notified. []      ED Course User Index  [] Jenna Harvey, APRN           /75 (BP Location: Left arm, Patient Position: Lying)   Pulse 84   Temp 97.8 °F (36.6 °C) (Oral)   Resp 20   Ht 157.5 cm (62\")   Wt 63.1 kg (139 lb 1.8 oz)   SpO2 99%   BMI 25.44 kg/m²   Labs Reviewed   COMPREHENSIVE METABOLIC PANEL - Abnormal; Notable for the following components:       Result Value    Albumin 3.40 (*)     ALT (SGPT) 1,922 (*)     AST (SGOT) 1,901 (*)     Alkaline Phosphatase 289 (*)     Total Bilirubin 14.6 (*)     All other components within normal limits    Narrative:     GFR Normal >60  Chronic Kidney Disease <60  Kidney Failure <15     PROTIME-INR - Abnormal; Notable for the following components:    Protime 14.9 (*)     INR 1.48 (*)     All other components within normal limits   APTT - Abnormal; Notable for the following components:    PTT 36.4 (*)     All other components within normal limits   URINALYSIS W/ MICROSCOPIC IF INDICATED (NO CULTURE) - Abnormal; Notable for the following components:    Color, UA Dark Yellow (*)     Bilirubin, UA Moderate (2+) (*)     Blood, UA Small (1+) (*)     All other components within normal limits   URINE DRUG SCREEN - Abnormal; Notable for the following components:    Amphet/Methamphet, Screen Positive (*)     All other components within normal limits    Narrative:     Negative Thresholds Per " Drugs Screened:    Amphetamines                 500 ng/ml  Barbiturates                 200 ng/ml  Benzodiazepines              100 ng/ml  Cocaine                      300 ng/ml  Methadone                    300 ng/ml  Opiates                      300 ng/ml  Oxycodone                    100 ng/ml  THC                           50 ng/ml    The Normal Value for all drugs tested is negative. This report includes final unconfirmed screening results to be used for medical treatment purposes only. Unconfirmed results must not be used for non-medical purposes such as employment or legal testing. Clinical consideration should be applied to any drug of abuse test, particularly when unconfirmed results are used.          All urine drugs of abuse requests without chain of custody are for medical screening purposes only.  False positives are possible.     CBC WITH AUTO DIFFERENTIAL - Abnormal; Notable for the following components:    RDW 17.0 (*)     Monocyte % 16.7 (*)     Monocytes, Absolute 1.00 (*)     All other components within normal limits   BILIRUBIN, DIRECT - Abnormal; Notable for the following components:    Bilirubin, Direct >10.0 (*)     All other components within normal limits   URINALYSIS, MICROSCOPIC ONLY - Abnormal; Notable for the following components:    RBC, UA 0-2 (*)     WBC, UA 0-2 (*)     All other components within normal limits   LIPASE - Normal   AMYLASE - Normal   AMMONIA - Normal   ACETAMINOPHEN LEVEL - Normal    Narrative:     Acetaminophen Therapeutic Range  5-20 ug/mL      Hours after ingestion            Toxic Value    4 Hours                           150 ug/mL    8 Hours                            70 ug/mL   12 Hours                            40 ug/mL   16 Hours                            20 ug/mL    These values apply to a single ingestion only.    MAGNESIUM - Normal   COVID PRE-OP / PRE-PROCEDURE SCREENING ORDER (NO ISOLATION)    Narrative:     The following orders were created for panel  order COVID PRE-OP / PRE-PROCEDURE SCREENING ORDER (NO ISOLATION) - Swab, Nasopharynx.  Procedure                               Abnormality         Status                     ---------                               -----------         ------                     COVID-19,CEPHEID/GUTIERREZ,CO...[[id number]]                      In process                   Please view results for these tests on the individual orders.   COVID-19,CEPHEID/GUTIERREZ,COR/KARISHMA/PAD/NILA IN-HOUSE,NP SWAB IN TRANSPORT MEDIA 3-4 HR TAT, RT-PCR   ETHANOL    Narrative:     Plasma Ethanol Clinical Symptoms:    ETOH (%)               Clinical Symptom  .01-.05              No apparent influence  .03-.12              Euphoria, Diminished judgment and attention   .09-.25              Impaired comprehension, Muscle incoordination  .18-.30              Confusion, Staggered gait, Slurred speech  .25-.40              Markedly decreased response to stimuli, unable to stand or                        walk, vomitting, sleep or stupor  .35-.50              Comatose, Anesthesia, Subnormal body temperature       TAD   HEPATIC FUNCTION PANEL   HEPATITIS PANEL, ACUTE   HIV-1 AND HIV-2 ANTIBODIES    Narrative:     The following orders were created for panel order HIV-1 & HIV-2 Antibodies.  Procedure                               Abnormality         Status                     ---------                               -----------         ------                     HIV-1 / O / 2 Ag / Antib...[[id number]]                                                   Please view results for these tests on the individual orders.   MONONUCLEOSIS SCREEN   HIV-1/O/2 ANTIGEN/ANTIBODY, 4TH GENERATION   CBC AND DIFFERENTIAL    Narrative:     The following orders were created for panel order CBC & Differential.  Procedure                               Abnormality         Status                     ---------                               -----------         ------                     CBC Auto  Differential[[id number]]        Abnormal            Final result                 Please view results for these tests on the individual orders.     Medications   sodium chloride 0.9 % flush 10 mL (10 mL Intravenous Given 5/13/22 1537)   nitroglycerin (NITROSTAT) SL tablet 0.4 mg (has no administration in time range)   sodium chloride 0.9 % flush 10 mL (has no administration in time range)   sodium chloride 0.9 % flush 10 mL (has no administration in time range)   acetaminophen (TYLENOL) tablet 650 mg (has no administration in time range)     Or   acetaminophen (TYLENOL) 160 MG/5ML solution 650 mg (has no administration in time range)     Or   acetaminophen (TYLENOL) suppository 650 mg (has no administration in time range)   aluminum-magnesium hydroxide-simethicone (MAALOX MAX) 400-400-40 MG/5ML suspension 15 mL (has no administration in time range)   ondansetron (ZOFRAN) tablet 4 mg (has no administration in time range)     Or   ondansetron (ZOFRAN) injection 4 mg (has no administration in time range)   melatonin tablet 5 mg (has no administration in time range)   Enoxaparin Sodium (LOVENOX) syringe 40 mg (has no administration in time range)   sodium chloride 0.9 % bolus 1,000 mL (0 mL Intravenous Stopped 5/13/22 1658)   iopamidol (ISOVUE-370) 76 % injection 100 mL (100 mL Intravenous Given 5/13/22 1733)     CT Abdomen Pelvis With Contrast    Result Date: 5/13/2022  1. Cholelithiasis with dilated gallbladder which may relate to cholecystitis, correlate with associated clinical findings. 2. Common bile duct dilated up to 7 mm with suspicion of a small amount of debris in the proximal CBD concerning for choledocholithiasis. This could be confirmed with MRCP. 3. Prominent dilated pelvic venous vessels asymmetric to the left abutting the uterus which are nonspecific but can be seen with pelvic venous congestion. 4. Small left pleural effusion.     Electronically Signed By-Flo Sylvester MD On:5/13/2022 6:36 PM This report  was finalized on [id number] by  Flo Sylvester MD.                                          MDM  Number of Diagnoses or Management Options  Elevated liver enzymes  Jaundice  Right upper quadrant pain  Diagnosis management comments: While in the emergency room an IV was established and labs were obtained.  Patient was given an IV fluid bolus.  She was without pain.  Labs were indicative of elevated liver enzymes, ALT 1922, AST 1901, alk phos was 289, total bilirubin was 14.6 and direct bili was greater than 10.  Patient's white count 5.9.  A call was placed to Dr. Pickering with general surgery who advised that patient would need a GI consult for possible MRCP, but advised that she may need a cholecystectomy following.  Spoke with Dr. Bautista with GI who suggested that an TAD and a hepatic profile be added to the patient's labs, these were added and are pending at time of admission..  Spoke with Dr. Ramey with the hospitalist group who agreed to admission, admitting physician Dr. Juárez.      CBC, CMP and PT and INR orders were placed for a.m. draw.    Chart review: No recent visits that are pertinent to today's complaint    Comorbidities: Unknown patient reports she does not have a primary care    Differentials: Cholecystitis, cirrhosis, liver dysfunction, biliary obstruction not all inclusive of differentials considered    Appropriate PPE worn throughout the care of this patient.           Amount and/or Complexity of Data Reviewed  Clinical lab tests: reviewed  Tests in the radiology section of CPT®: reviewed    Patient Progress  Patient progress: stable      Final diagnoses:   Jaundice   Right upper quadrant pain   Elevated liver enzymes       ED Disposition  ED Disposition     ED Disposition   Decision to Admit    Condition   --    Comment   Level of Care: Med/Surg [1]   Admitting Physician: SKYLAR JUÁREZ [[id number]]   Attending Physician: SKYLAR JUÁREZ [[id number]]               No follow-up provider specified.        Medication List      No changes were made to your prescriptions during this visit.          Jenna Harvey APRN  05/13/22 2022      Electronically signed by Jenna Harvey APRN at 05/13/22 2022       Vital Signs (last day)     Date/Time Temp Temp src Pulse Resp BP Patient Position SpO2    05/16/22 0038 98.3 (36.8) Oral 75 16 122/66 Lying 99    05/15/22 1609 97.6 (36.4) -- 69 -- 149/93 -- 98    05/15/22 0807 -- -- 70 14 108/69 Lying 96    05/15/22 0525 98.2 (36.8) Oral 77 16 100/74 Lying 96            Facility-Administered Medications as of 5/16/2022   Medication Dose Route Frequency Provider Last Rate Last Admin   • acetaminophen (TYLENOL) tablet 500 mg  500 mg Oral Q6H PRN Kris Ramey MD        Or   • acetaminophen (TYLENOL) 160 MG/5ML solution 500 mg  500 mg Oral Q6H PRN Kris Ramey MD        Or   • acetaminophen (TYLENOL) suppository 325 mg  325 mg Rectal Q6H PRN Kris Ramey MD       • aluminum-magnesium hydroxide-simethicone (MAALOX MAX) 400-400-40 MG/5ML suspension 15 mL  15 mL Oral Q6H PRN Kris Ramey MD       • [COMPLETED] gadoteridol (PROHANCE) injection 20 mL  20 mL Intravenous Once in imaging Kris Ramey MD   20 mL at 05/14/22 0742   • [COMPLETED] iopamidol (ISOVUE-370) 76 % injection 100 mL  100 mL Intravenous Once in imaging Ricardo Harvey MD   100 mL at 05/13/22 1733   • ipratropium-albuterol (DUO-NEB) nebulizer solution 3 mL  3 mL Nebulization Q6H PRN Kris Ramey MD       • lactated ringers infusion  100 mL/hr Intravenous Continuous Kris Ramey  mL/hr at 05/15/22 2305 100 mL/hr at 05/15/22 2305   • [COMPLETED] magnesium sulfate 2g/50 mL (PREMIX) infusion  2 g Intravenous Once Kris Ramey MD   2 g at 05/13/22 2234   • melatonin tablet 5 mg  5 mg Oral Nightly PRN Kris Ramey MD   5 mg at 05/16/22 0158   • nicotine (NICODERM CQ) 14 MG/24HR patch 1 patch  1 patch Transdermal Q24H Kris Ramey MD   1 patch at 05/15/22 2046   •  nitroglycerin (NITROSTAT) SL tablet 0.4 mg  0.4 mg Sublingual Q5 Min PRN Kris Ramey MD       • ondansetron (ZOFRAN) tablet 4 mg  4 mg Oral Q6H PRN Kris Ramey MD        Or   • ondansetron (ZOFRAN) injection 4 mg  4 mg Intravenous Q6H PRN Kris Ramey MD       • oxyCODONE (ROXICODONE) immediate release tablet 5 mg  5 mg Oral Q6H PRN Jean Pierre Bowman DO   5 mg at 05/15/22 2233   • pantoprazole (PROTONIX) EC tablet 40 mg  40 mg Oral Q AM Kris Ramey MD   40 mg at 05/16/22 0506   • [COMPLETED] piperacillin-tazobactam (ZOSYN) IVPB 3.375 g in 100 mL NS (CD)  3.375 g Intravenous Once Sonia Stockton APRN   3.375 g at 05/14/22 1052   • [COMPLETED] sodium chloride 0.9 % bolus 1,000 mL  1,000 mL Intravenous Once Jenna Harvey APRN   Stopped at 05/13/22 1658   • sodium chloride 0.9 % flush 10 mL  10 mL Intravenous PRN Jenna Harvey APRN   10 mL at 05/13/22 1537   • sodium chloride 0.9 % flush 10 mL  10 mL Intravenous Q12H Kris Ramey MD   10 mL at 05/16/22 0927   • sodium chloride 0.9 % flush 10 mL  10 mL Intravenous PRN Kris Ramey MD       • thiamine (VITAMIN B-1) tablet 100 mg  100 mg Oral Daily Sonia Stockton APRN   100 mg at 05/16/22 0926   • traZODone (DESYREL) tablet 50 mg  50 mg Oral Nightly Kris Ramey MD   50 mg at 05/15/22 2045   • vitamin K1 (PHYTONADIONE) 1 MG/1 ML oral solution 5 mg  5 mg Oral Once Kayla Parker APRN       • zinc sulfate (ZINCATE) capsule 220 mg  220 mg Oral Daily Sonia Stockton APRN   220 mg at 05/16/22 0926       Lab Results (last 24 hours)     Procedure Component Value Units Date/Time    Xvai-Barr Virus VCA Antibody Panel [[id number]]  (Abnormal) Collected: 05/14/22 0200    Specimen: Blood Updated: 05/16/22 1100     EBV VCA IgM Negative     EBV VCA IgG Positive     EBV Nuclear Ag Positive     EBV Early Ag Negative    Comprehensive Metabolic Panel [[id number]]  (Abnormal) Collected: 05/16/22 0142    Specimen: Blood Updated:  05/16/22 0349     Glucose 114 mg/dL      BUN 7 mg/dL      Creatinine 0.69 mg/dL      Sodium 138 mmol/L      Potassium 3.9 mmol/L      Chloride 105 mmol/L      CO2 23.0 mmol/L      Calcium 8.6 mg/dL      Total Protein 5.8 g/dL      Albumin 2.70 g/dL      ALT (SGPT) 1,912 U/L      AST (SGOT) 1,513 U/L      Alkaline Phosphatase 230 U/L      Total Bilirubin 12.0 mg/dL      Globulin 3.1 gm/dL      A/G Ratio 0.9 g/dL      BUN/Creatinine Ratio 10.1     Anion Gap 10.0 mmol/L      eGFR 100.7 mL/min/1.73      Comment: National Kidney Foundation and American Society of Nephrology (ASN) Task Force recommended calculation based on the Chronic Kidney Disease Epidemiology Collaboration (CKD-EPI) equation refit without adjustment for race.       Narrative:      GFR Normal >60  Chronic Kidney Disease <60  Kidney Failure <15      Protime-INR [[id number]]  (Abnormal) Collected: 05/16/22 0142    Specimen: Blood Updated: 05/16/22 0318     Protime 15.6 Seconds      INR 1.55    CBC & Differential [[id number]]  (Abnormal) Collected: 05/16/22 0142    Specimen: Blood Updated: 05/16/22 0313    Narrative:      The following orders were created for panel order CBC & Differential.  Procedure                               Abnormality         Status                     ---------                               -----------         ------                     CBC Auto Differential[[id number]]        Abnormal            Final result                 Please view results for these tests on the individual orders.    CBC Auto Differential [[id number]]  (Abnormal) Collected: 05/16/22 0142    Specimen: Blood Updated: 05/16/22 0313     WBC 6.90 10*3/mm3      RBC 3.76 10*6/mm3      Hemoglobin 11.0 g/dL      Hematocrit 33.9 %      MCV 90.0 fL      MCH 29.1 pg      MCHC 32.4 g/dL      RDW 17.3 %      RDW-SD 53.8 fl      MPV 10.7 fL      Platelets 240 10*3/mm3      Neutrophil % 58.7 %      Lymphocyte % 19.8 %      Monocyte % 18.6 %      Eosinophil % 2.2 %      Basophil  "% 0.7 %      Neutrophils, Absolute 4.10 10*3/mm3      Lymphocytes, Absolute 1.40 10*3/mm3      Monocytes, Absolute 1.30 10*3/mm3      Eosinophils, Absolute 0.20 10*3/mm3      Basophils, Absolute 0.00 10*3/mm3      nRBC 0.2 /100 WBC     Comprehensive Metabolic Panel [[id number]]  (Abnormal) Collected: 05/15/22 1038    Specimen: Blood Updated: 05/15/22 1136     Glucose 142 mg/dL      BUN 9 mg/dL      Creatinine 0.58 mg/dL      Sodium 137 mmol/L      Potassium 3.9 mmol/L      Chloride 103 mmol/L      CO2 24.0 mmol/L      Calcium 8.7 mg/dL      Total Protein 6.1 g/dL      Albumin 2.80 g/dL      ALT (SGPT) 2,088 U/L      AST (SGOT) 1,887 U/L      Alkaline Phosphatase 251 U/L      Total Bilirubin 13.7 mg/dL      Globulin 3.3 gm/dL      A/G Ratio 0.8 g/dL      BUN/Creatinine Ratio 15.5     Anion Gap 10.0 mmol/L      eGFR 105.0 mL/min/1.73      Comment: National Kidney Foundation and American Society of Nephrology (ASN) Task Force recommended calculation based on the Chronic Kidney Disease Epidemiology Collaboration (CKD-EPI) equation refit without adjustment for race.       Narrative:      GFR Normal >60  Chronic Kidney Disease <60  Kidney Failure <15      CK [[id number]]  (Normal) Collected: 05/15/22 1038    Specimen: Blood Updated: 05/15/22 1120     Creatine Kinase 23 U/L     Magnesium [[id number]]  (Normal) Collected: 05/15/22 1038    Specimen: Blood Updated: 05/15/22 1120     Magnesium 1.6 mg/dL         Imaging Results (Last 48 Hours)     ** No results found for the last 48 hours. **           Physician Progress Notes (last 48 hours)      Kayla Parker, APRN at 05/16/22 0916           LOS: 3 days   Patient Care Team:  Provider, No Known as PCP - General      Subjective \"I had a rough night\"    Interval History:     Subjective: Denies nausea or vomiting and is eating well.  Denies abdominal pain but has pressure.      ROS:   No chest pain, shortness of breath, or cough.        Medication Review:     Current " Facility-Administered Medications:   •  acetaminophen (TYLENOL) tablet 500 mg, 500 mg, Oral, Q6H PRN **OR** acetaminophen (TYLENOL) 160 MG/5ML solution 500 mg, 500 mg, Oral, Q6H PRN **OR** acetaminophen (TYLENOL) suppository 325 mg, 325 mg, Rectal, Q6H PRN, Kris Ramey MD  •  aluminum-magnesium hydroxide-simethicone (MAALOX MAX) 400-400-40 MG/5ML suspension 15 mL, 15 mL, Oral, Q6H PRN, Kris Ramey MD  •  ipratropium-albuterol (DUO-NEB) nebulizer solution 3 mL, 3 mL, Nebulization, Q6H PRN, Kris Ramey MD  •  lactated ringers infusion, 100 mL/hr, Intravenous, Continuous, Kris Ramey MD, Last Rate: 100 mL/hr at 05/15/22 2305, 100 mL/hr at 05/15/22 2305  •  melatonin tablet 5 mg, 5 mg, Oral, Nightly PRN, Kris Ramey MD, 5 mg at 05/16/22 0158  •  nicotine (NICODERM CQ) 14 MG/24HR patch 1 patch, 1 patch, Transdermal, Q24H, Kris Ramey MD, 1 patch at 05/15/22 2046  •  nitroglycerin (NITROSTAT) SL tablet 0.4 mg, 0.4 mg, Sublingual, Q5 Min PRN, Kris Ramey MD  •  ondansetron (ZOFRAN) tablet 4 mg, 4 mg, Oral, Q6H PRN **OR** ondansetron (ZOFRAN) injection 4 mg, 4 mg, Intravenous, Q6H PRN, Kris Ramey MD  •  oxyCODONE (ROXICODONE) immediate release tablet 5 mg, 5 mg, Oral, Q6H PRN, Jean Pierre Bowman DO, 5 mg at 05/15/22 2233  •  pantoprazole (PROTONIX) EC tablet 40 mg, 40 mg, Oral, Q AM, Kris Ramey MD, 40 mg at 05/16/22 0506  •  [COMPLETED] Insert peripheral IV, , , Once **AND** sodium chloride 0.9 % flush 10 mL, 10 mL, Intravenous, PRN, Jenna Harvey APRN, 10 mL at 05/13/22 1537  •  sodium chloride 0.9 % flush 10 mL, 10 mL, Intravenous, Q12H, Kris Ramey MD, 10 mL at 05/15/22 2045  •  sodium chloride 0.9 % flush 10 mL, 10 mL, Intravenous, PRN, Kris Ramey MD  •  thiamine (VITAMIN B-1) tablet 100 mg, 100 mg, Oral, Daily, Sonia Stockton APRN, 100 mg at 05/15/22 1008  •  traZODone (DESYREL) tablet 50 mg, 50 mg, Oral, Nightly, Kris Ramey MD, 50 mg at  05/15/22 2045  •  zinc sulfate (ZINCATE) capsule 220 mg, 220 mg, Oral, Daily, Sonia Stockton, BLAINE, 220 mg at 05/15/22 1008      Objective Resting in bed, ill-appearing but no acute distress, no family present    Vital Signs  Vitals:    05/15/22 0525 05/15/22 0807 05/15/22 1609 05/16/22 0038   BP: 100/74 108/69 149/93 122/66   BP Location: Left arm Left arm  Left arm   Patient Position: Lying Lying  Lying   Pulse: 77 70 69 75   Resp: 16 14  16   Temp: 98.2 °F (36.8 °C)  97.6 °F (36.4 °C) 98.3 °F (36.8 °C)   TempSrc: Oral   Oral   SpO2: 96% 96% 98% 99%   Weight:       Height:           Physical Exam:     General Appearance:    Awake and alert, in no acute distress   Head:    Normocephalic, without obvious abnormality   Eyes:          Conjunctivae normal, icteric sclera   Throat:   No oral lesions, no thrush, oral mucosa moist   Neck:   No adenopathy, supple, no JVD   Lungs:     respirations regular, even and unlabored       Rectal:     Deferred   Extremities:   No edema, no cyanosis   Skin:   No bruising or rash,  jaundice        Results Review:    CBC    Results from last 7 days   Lab Units 05/16/22  0142 05/15/22  1038 05/14/22  0200 05/13/22  1539   WBC 10*3/mm3 6.90 4.90 6.10 5.90   HEMOGLOBIN g/dL 11.0* 11.5* 11.8* 12.8   PLATELETS 10*3/mm3 240 241 272 308     CMP   Results from last 7 days   Lab Units 05/16/22  0142 05/15/22  1038 05/14/22  0200 05/13/22  1539   SODIUM mmol/L 138 137 135* 137   POTASSIUM mmol/L 3.9 3.9 3.9 4.1   CHLORIDE mmol/L 105 103 100 102   CO2 mmol/L 23.0 24.0 23.0 26.0   BUN mg/dL 7 9 9 10   CREATININE mg/dL 0.69 0.58 0.56* 0.61   GLUCOSE mg/dL 114* 142* 84 99   ALBUMIN g/dL 2.70* 2.80* 3.10* 3.40*   BILIRUBIN mg/dL 12.0* 13.7* 14.3* 14.6*   ALK PHOS U/L 230* 251* 272* 289*   AST (SGOT) U/L 1,513* 1,887* 2,095* 1,901*   ALT (SGPT) U/L 1,912* 2,088* 2,097* 1,922*   MAGNESIUM mg/dL  --  1.6 1.8 1.7   AMYLASE U/L  --   --   --  87   LIPASE U/L  --   --   --  29   AMMONIA umol/L  --   --    --  18     Cr Clearance Estimated Creatinine Clearance: 77.6 mL/min (by C-G formula based on SCr of 0.69 mg/dL).  Coag   Results from last 7 days   Lab Units 22  0142 05/15/22  1038 22  0200 22  1539   INR  1.55* 1.65* 1.51* 1.48*   APTT seconds  --   --   --  36.4*     HbA1C No results found for: HGBA1C  Blood Glucose No results found for: POCGLU  Infection     UA    Results from last 7 days   Lab Units 22  1702   NITRITE UA  Negative   WBC UA /HPF 0-2*   BACTERIA UA /HPF None Seen   SQUAM EPITHEL UA /HPF 0-2     Radiology(recent) No radiology results for the last day       Assessment & Plan   Acute hepatitis   Cholelithiasis  Meth usage/Alcohol usage  Hyponatremia  Mild normocytic anemia  Recent tick bite  GERD     PLAN:  Suspect acute hepatitis B with possible chronic hepatitis C, confirming labs pending.  CMV, tick panel and EBV pending.  LFTs and INR trending down today.  Full liver serology work-up in progress.  Remains on PPI, thiamine and zinc.  Add oral vitamin K.  Good nutrition encouraged.  Continue supportive care and we will watch closely.  If labs continue to improve, may be able to consider discharge home tomorrow.  We will follow.    Electronically signed by BLAINE Marquez, 22, 9:16 AM EDT.           Electronically signed by Kayla Parker APRN at 22 0920     Manjinder Pickering MD at 05/15/22 1644          General Surgery Progress Note    Name: Grace Birmingham ADMIT: 2022   : 1965  PCP: Provider, No Known    MRN: [id number] LOS: 2 days   AGE/SEX: 56 y.o. female  ROOM: 312/1   [facility]    Chief Complaint   Patient presents with   • Jaundice     Pt c/o yellow eyes, fatigue since last week     Subjective     56 y.o. female admitted with right upper quadrant abdominal symptoms jaundice significant elevation in LFTs and bilirubin found to have hepatitis    In general feeling may be some better still pretty tired not having much pain.  No new fevers  or chills.    Objective     Scheduled Medications:   nicotine, 1 patch, Transdermal, Q24H  [START ON 5/16/2022] pantoprazole, 40 mg, Oral, Q AM  sodium chloride, 10 mL, Intravenous, Q12H  thiamine, 100 mg, Oral, Daily  traZODone, 50 mg, Oral, Nightly  zinc sulfate, 220 mg, Oral, Daily        Active Infusions:  lactated ringers, 100 mL/hr, Last Rate: 100 mL/hr (05/15/22 1353)        As Needed Medications:  •  acetaminophen **OR** acetaminophen **OR** acetaminophen  •  aluminum-magnesium hydroxide-simethicone  •  ipratropium-albuterol  •  melatonin  •  nitroglycerin  •  ondansetron **OR** ondansetron  •  oxyCODONE  •  [COMPLETED] Insert peripheral IV **AND** sodium chloride  •  sodium chloride    Vital Signs  Vital Signs Patient Vitals for the past 24 hrs:   BP Temp Temp src Pulse Resp SpO2   05/15/22 1609 149/93 97.6 °F (36.4 °C) -- 69 -- 98 %   05/15/22 0807 108/69 -- -- 70 14 96 %   05/15/22 0525 100/74 98.2 °F (36.8 °C) Oral 77 16 96 %   05/14/22 2110 -- 98.2 °F (36.8 °C) Oral 75 20 97 %   05/14/22 1756 131/80 97.9 °F (36.6 °C) -- 83 -- 97 %       Physical Exam:  Physical Exam  Constitutional:       Appearance: Normal appearance.   Eyes:      General: Scleral icterus present.         Right eye: No discharge.         Left eye: No discharge.   Cardiovascular:      Rate and Rhythm: Normal rate.   Pulmonary:      Effort: Pulmonary effort is normal.   Abdominal:      General: There is no distension.   Skin:     General: Skin is warm and dry.      Coloration: Skin is jaundiced.   Neurological:      General: No focal deficit present.      Mental Status: She is alert. Mental status is at baseline.         Results Review:     CBC    Results from last 7 days   Lab Units 05/15/22  1038 05/14/22  0200 05/13/22  1539   WBC 10*3/mm3 4.90 6.10 5.90   HEMOGLOBIN g/dL 11.5* 11.8* 12.8   PLATELETS 10*3/mm3 241 272 308     CMP   Results from last 7 days   Lab Units 05/15/22  1038 05/14/22  0200 05/13/22  1539   SODIUM mmol/L 137  135* 137   POTASSIUM mmol/L 3.9 3.9 4.1   CHLORIDE mmol/L 103 100 102   CO2 mmol/L 24.0 23.0 26.0   BUN mg/dL 9 9 10   CREATININE mg/dL 0.58 0.56* 0.61   GLUCOSE mg/dL 142* 84 99   ALBUMIN g/dL 2.80* 3.10* 3.40*   BILIRUBIN mg/dL 13.7* 14.3* 14.6*   ALK PHOS U/L 251* 272* 289*   AST (SGOT) U/L 1,887* 2,095* 1,901*   ALT (SGPT) U/L 2,088* 2,097* 1,922*   AMYLASE U/L  --   --  87   LIPASE U/L  --   --  29   AMMONIA umol/L  --   --  18     Radiology(recent) CT Abdomen Pelvis With Contrast    Result Date: 5/13/2022  1. Cholelithiasis with dilated gallbladder which may relate to cholecystitis, correlate with associated clinical findings. 2. Common bile duct dilated up to 7 mm with suspicion of a small amount of debris in the proximal CBD concerning for choledocholithiasis. This could be confirmed with MRCP. 3. Prominent dilated pelvic venous vessels asymmetric to the left abutting the uterus which are nonspecific but can be seen with pelvic venous congestion. 4. Small left pleural effusion.     Electronically Signed By-Flo Sylvester MD On:5/13/2022 6:36 PM This report was finalized on [id number] by  Flo Sylvester MD.    MRI abdomen w wo contrast mrcp    Result Date: 5/14/2022  IMPRESSION : No evidence of choledocholithiasis or biliary obstruction. Normal diameter of the common bile duct.  Solitary 9 mm gallstone, nonobstructing. No evidence of cholecystitis.  Diffuse periportal edema, nonspecific. This could relate to hepatitis or hepatic congestion as primary differential considerations.  Electronically Signed By-Mulugeta Aguillon On:5/14/2022 9:36 AM This report was finalized on [id number] by  Mulugeta Aguillon, .      I reviewed the patient's new clinical results.  Discussed with medicine  Assessment & Plan       Hepatitis    Hyperbilirubinemia    Tobacco abuse    Amphetamine abuse (HCC)    Jaundice      56 y.o. female with upper abdominal symptoms jaundice possible hepatitis    Hepatitis seems to be more likely to explain  her symptoms then the cholelithiasis.  At this point cholecystectomy is not indicated.  Will defer to the outpatient setting for more counseling and/or consideration of removal of the gallbladder if it is symptomatic.     Follow-up as needed in the office        This note was created using Dragon Voice Recognition software.    Manjinder Pickering MD  05/15/22  16:44 EDT    Electronically signed by Manjinder Pickering MD at 05/15/22 1645     Jean Pierre Bowman DO at 05/15/22 1207              [facility] Medicine Services Daily Progress Note    Patient Name: Grace Birmingham  : 1965  MRN: [id number]  Primary Care Physician:  Provider, No Known  Date of admission: 2022      Subjective      Chief Complaint: Jaundice      Patient Reports     22: Claims 1 week history of fatigue, jaundice and diarrhea.  NPO.  GI consulted.  Denies excessive alcohol or Tylenol use.    5/15/22: Tolerating diet.  AST and ALT still elevated.  Hepatitis B&C positive.  Awaiting quantitative PCR.    Review of Systems   All other systems reviewed and are negative.         Objective      Vitals:   Temp:  [97.9 °F (36.6 °C)-98.2 °F (36.8 °C)] 98.2 °F (36.8 °C)  Heart Rate:  [70-83] 70  Resp:  [14-20] 14  BP: (100-131)/(69-80) 108/69    Physical Exam  HENT:      Head: Normocephalic.      Nose: Nose normal.   Eyes:      Extraocular Movements: Extraocular movements intact.      Pupils: Pupils are equal, round, and reactive to light.   Cardiovascular:      Rate and Rhythm: Normal rate and regular rhythm.   Pulmonary:      Effort: Pulmonary effort is normal.   Abdominal:      General: Bowel sounds are normal.   Musculoskeletal:         General: Normal range of motion.      Cervical back: Normal range of motion.   Skin:     General: Skin is warm.      Coloration: Skin is jaundiced.   Neurological:      General: No focal deficit present.      Mental Status: She is alert.   Psychiatric:         Mood and Affect: Mood  normal.             Result Review    Result Review:  I have personally reviewed the results from the time of this admission to 5/15/2022 12:07 EDT and agree with these findings:  [x]  Laboratory  []  Microbiology  [x]  Radiology  []  EKG/Telemetry   []  Cardiology/Vascular   []  Pathology  [x]  Old records  []  Other:            Assessment & Plan      Brief Patient Summary:    56-year-old female with painless jaundice      nicotine, 1 patch, Transdermal, Q24H  pantoprazole, 40 mg, Intravenous, Q AM  piperacillin-tazobactam, 3.375 g, Intravenous, Q8H  sodium chloride, 10 mL, Intravenous, Q12H  thiamine, 100 mg, Oral, Daily  traZODone, 50 mg, Oral, Nightly  zinc sulfate, 220 mg, Oral, Daily       lactated ringers, 100 mL/hr, Last Rate: 100 mL/hr (05/14/22 1131)         Active Hospital Problems:  Active Hospital Problems    Diagnosis    • **Hepatitis    • Jaundice    • Hyperbilirubinemia    • Tobacco abuse    • Amphetamine abuse (HCC)      Acute hepatitis from suspected viral hepatitis:  -s/p CT abdomen pelvis 5/13/22  -MRCP 5/14/22--> 9 mm nonobstructing gallstone.  No cholecystitis.  Diffuse periportal edema.  Normal CBD.   -GI consulted--> acute hepatitis work-up lab work pending  -General surgery consulted:--> Ruled out cholecystitis.  Ruled out choledocholithiasis  -Acute viral hepatitis panel--> hepatitis B IgM and hepatitis C antibody positive    Methamphetamine abuse:  -Counseled on cessation.  -Claims past use of IVDA many years ago    Tobacco abuse:  -Continue nicotine patch    DVT prophylaxis:  No DVT prophylaxis order currently exists.    CODE STATUS:    Level Of Support Discussed With: Patient  Code Status (Patient has no pulse and is not breathing): CPR (Attempt to Resuscitate)  Medical Interventions (Patient has pulse or is breathing): Full Support      Disposition:  I expect patient to be discharged defer.    This patient has been examined wearing appropriate Personal Protective Equipment and discussed  "with nursing. 05/15/22      Electronically signed by Jean Pierre Bowman DO, 05/15/22, 12:07 EDT.  [facility] Hospitalist Team             Electronically signed by Jean Pierre Bowman DO at 05/15/22 1210     Sonia Stockton APRN at 05/15/22 1203     Attestation signed by Tyesha Bautista MD at 05/15/22 1224    I, the attending physician, have performed the medical decision making for this patient.  Feeling somewhat better appetite improving.  Still feeling tired fatigue jaundice with a dark urine on examination significantly icteric skin, abdomen soft nondistended minimally tender.  Labs shows hemoglobin 7.5 platelet 241.  AST 1887 slightly improved from more than 2000 yesterday.  ALT 2088 slight improvement compared to yesterday.  Normal amylase lipase, bilirubin 13.7.  INR has been slowly going up from 1.48-1.5 yesterday and to 1.65 today.  On examination no asterixis was noticed no change in mental status.  Hepatitis B IgM is positive hepatitis B C antibody positive.    Suspect patient most likely have acute hepatitis B with possibly chronic hepatitis C underlying however cannot confirm chronicity of hepatitis C.  Clinically she does not have any hepatic encephalopathy asterixis however her INR has slightly increased, we will wait another 24 hours before discharging home, if INR continues to increase or she shows any sign of asterixis, patient will be started on a treatment with Entecavir or tenofovir.  Check HCVRNA as well as HBV DNA level.  If INR started decreasing she can be discharged home without any medication with the supportive care and follow-up with the GI clinic.  Encourage p.o. intake.  Repeat labs.                   LOS: 2 days   Patient Care Team:  Provider, No Known as PCP - General      Subjective   \"I need to get home to take care of my mother's pets.\"    Interval History:   Refused morning labs.  Reordered.  Labs: Hepatitis B and C antibodies body positive.  Creatinine 0.58.  TB down " to 13.7, AST improved at 1887, ALT about the same at 2088, alk phos down to 251.  Magnesium 1.6.  INR 1.65. WBCs 4.9, hemoglobin 11.5, platelets 241.  Feeling somewhat better today.  Tolerating diet.    ROS:   Jaundice.  Fatigue.  No chest pain, shortness of breath, or cough.       Medication Review:     Current Facility-Administered Medications:   •  acetaminophen (TYLENOL) tablet 500 mg, 500 mg, Oral, Q6H PRN **OR** acetaminophen (TYLENOL) 160 MG/5ML solution 500 mg, 500 mg, Oral, Q6H PRN **OR** acetaminophen (TYLENOL) suppository 325 mg, 325 mg, Rectal, Q6H PRN, Kris Ramey MD  •  aluminum-magnesium hydroxide-simethicone (MAALOX MAX) 400-400-40 MG/5ML suspension 15 mL, 15 mL, Oral, Q6H PRN, Kris Ramey MD  •  ipratropium-albuterol (DUO-NEB) nebulizer solution 3 mL, 3 mL, Nebulization, Q6H PRN, Kris Ramey MD  •  lactated ringers infusion, 100 mL/hr, Intravenous, Continuous, Kris Ramey MD, Last Rate: 100 mL/hr at 05/14/22 1131, 100 mL/hr at 05/14/22 1131  •  melatonin tablet 5 mg, 5 mg, Oral, Nightly PRN, Kris Ramey MD, 5 mg at 05/15/22 0053  •  nicotine (NICODERM CQ) 14 MG/24HR patch 1 patch, 1 patch, Transdermal, Q24H, Kris Ramey MD, 1 patch at 05/14/22 2153  •  nitroglycerin (NITROSTAT) SL tablet 0.4 mg, 0.4 mg, Sublingual, Q5 Min PRN, Kris Ramey MD  •  ondansetron (ZOFRAN) tablet 4 mg, 4 mg, Oral, Q6H PRN **OR** ondansetron (ZOFRAN) injection 4 mg, 4 mg, Intravenous, Q6H PRN, Kris Ramey MD  •  pantoprazole (PROTONIX) injection 40 mg, 40 mg, Intravenous, Q AM, Kris Ramey MD, 40 mg at 05/15/22 0513  •  piperacillin-tazobactam (ZOSYN) IVPB 3.375 g in 100 mL NS (CD), 3.375 g, Intravenous, Q8H, Sonia Stockton, APRN, 3.375 g at 05/15/22 1008  •  [COMPLETED] Insert peripheral IV, , , Once **AND** sodium chloride 0.9 % flush 10 mL, 10 mL, Intravenous, PRN, Jenna Harvey APRN, 10 mL at 05/13/22 1537  •  sodium chloride 0.9 % flush 10 mL, 10 mL, Intravenous,  Q12H, Kris Ramey MD, 10 mL at 05/15/22 1009  •  sodium chloride 0.9 % flush 10 mL, 10 mL, Intravenous, PRN, Kris Ramey MD  •  thiamine (VITAMIN B-1) tablet 100 mg, 100 mg, Oral, Daily, Sonia Stockton APRN, 100 mg at 05/15/22 1008  •  traZODone (DESYREL) tablet 50 mg, 50 mg, Oral, Nightly, Kris Ramey MD, 50 mg at 05/14/22 2151  •  zinc sulfate (ZINCATE) capsule 220 mg, 220 mg, Oral, Daily, Sonia Stockton APRN, 220 mg at 05/15/22 1008      Objective  Resting hospital bed.  Somewhat less jaundiced today.    Vital Signs  Temp:  [97.9 °F (36.6 °C)-98.2 °F (36.8 °C)] 98.2 °F (36.8 °C)  Heart Rate:  [70-83] 70  Resp:  [14-20] 14  BP: (100-131)/(69-80) 108/69  Physical Exam:    General Appearance:    Awake and alert, in no acute distress   Head:    Normocephalic, without obvious abnormality   Eyes:          Conjunctivae normal, icteric sclerae   Ears:    Hearing intact   Throat:   No oral lesions, no thrush, oral mucosa moist   Neck:   No adenopathy, supple, no JVD   Lungs:      respirations regular, even and unlabored       Abdomen:     Normal bowel sounds, soft, non-tender, no rebound or guarding, non-distended, no hepatosplenomegaly   Rectal:     Deferred   Extremities:   No edema, no cyanosis, no redness   Skin:   No bleeding, bruising or rash, + jaundice   Neurologic:   Cranial nerves 2 - 12 grossly intact, no asterixis, sensation   intact        Results Review:    CBC    Results from last 7 days   Lab Units 05/15/22  1038 05/14/22  0200 05/13/22  1539   WBC 10*3/mm3 4.90 6.10 5.90   HEMOGLOBIN g/dL 11.5* 11.8* 12.8   PLATELETS 10*3/mm3 241 272 308     CMP   Results from last 7 days   Lab Units 05/15/22  1038 05/14/22  0200 05/13/22  1539   SODIUM mmol/L 137 135* 137   POTASSIUM mmol/L 3.9 3.9 4.1   CHLORIDE mmol/L 103 100 102   CO2 mmol/L 24.0 23.0 26.0   BUN mg/dL 9 9 10   CREATININE mg/dL 0.58 0.56* 0.61   GLUCOSE mg/dL 142* 84 99   ALBUMIN g/dL 2.80* 3.10* 3.40*   BILIRUBIN mg/dL 13.7* 14.3*  14.6*   ALK PHOS U/L 251* 272* 289*   AST (SGOT) U/L 1,887* 2,095* 1,901*   ALT (SGPT) U/L 2,088* 2,097* 1,922*   MAGNESIUM mg/dL 1.6 1.8 1.7   AMYLASE U/L  --   --  87   LIPASE U/L  --   --  29   AMMONIA umol/L  --   --  18     Cr Clearance Estimated Creatinine Clearance: 92.3 mL/min (by C-G formula based on SCr of 0.58 mg/dL).  Coag   Results from last 7 days   Lab Units 05/15/22  1038 05/14/22  0200 05/13/22  1539   INR  1.65* 1.51* 1.48*   APTT seconds  --   --  36.4*     HbA1C No results found for: HGBA1C  Blood Glucose No results found for: POCGLU  Infection     UA    Results from last 7 days   Lab Units 05/13/22  1702   NITRITE UA  Negative   WBC UA /HPF 0-2*   BACTERIA UA /HPF None Seen   SQUAM EPITHEL UA /HPF 0-2     Radiology(recent) CT Abdomen Pelvis With Contrast    Result Date: 5/13/2022  1. Cholelithiasis with dilated gallbladder which may relate to cholecystitis, correlate with associated clinical findings. 2. Common bile duct dilated up to 7 mm with suspicion of a small amount of debris in the proximal CBD concerning for choledocholithiasis. This could be confirmed with MRCP. 3. Prominent dilated pelvic venous vessels asymmetric to the left abutting the uterus which are nonspecific but can be seen with pelvic venous congestion. 4. Small left pleural effusion.     Electronically Signed By-Flo Sylvester MD On:5/13/2022 6:36 PM This report was finalized on [id number] by  Flo Sylvester MD.    MRI abdomen w wo contrast mrcp    Result Date: 5/14/2022  IMPRESSION : No evidence of choledocholithiasis or biliary obstruction. Normal diameter of the common bile duct.  Solitary 9 mm gallstone, nonobstructing. No evidence of cholecystitis.  Diffuse periportal edema, nonspecific. This could relate to hepatitis or hepatic congestion as primary differential considerations.  Electronically Signed By-Mulugeta Aguillon On:5/14/2022 9:36 AM This report was finalized on [id number] by  Mulugeta Aguillon .            Assessment  & Plan     Acute biliary hepatitis consider related to Hep B & C   Elevated LFTs due to above  Abnormal CT indicating cholelithiasis with dilated biliary duct of 7 mm  Right upper quadrant abdominal pain consider above  Tobacco abuse  Meth usage  Alcohol usage  Hyponatremia  Mild normocytic anemia  Elevated INR consider related to hepatitis  Recent tick bite  GERD     PLAN:  Hepatitis B and C came back positive yesterday.  Pending viral loads.  INR is increasing.  Would recommend she stay another day at least until we start seeing a downward trend.  Pending TAD, anti-smooth muscle antibody, autoimmune markers/IgG.  Pending CMV and Xavi-Barr and tick panel.  Continue IV PPI  Zosyn has been discontinued.  Encouraged good nutrition with supplements.  Antiemetic and pain medications as needed  Hold Lovenox for pending ERCP versus cholecystectomy  Recommend tobacco, alcohol and meth cessation  Continue zinc and thiamine.  Supportive care in the interim      BLAINE Rm  05/15/22  12:03 EDT            Electronically signed by Tyesha Bautista MD at 05/15/22 1224          Consult Notes (last 48 hours)      Manjinder Pickering MD at 22 1415      Consult Orders    1. Inpatient General Surgery Consult [[id number]] ordered by Kris Ramey MD               Inpatient General Surgery Consult  Consult performed by: Manjinder Pickering MD  Consult ordered by: Kris Ramey MD  Reason for consult: Cholelithiasis          General Surgery Consult Note      Name: Grace Birmingham ADMIT: 2022   : 1965  PCP: Jacob, No Known    MRN: [id number] LOS: 1 days   AGE/SEX: 56 y.o. female  ROOM: [address] [place] [address]      Patient Care Team:  Provider, No Known as PCP - General  Chief Complaint   Patient presents with   • Jaundice     Pt c/o yellow eyes, fatigue since last week       Subjective   56-year-old lady admitted to hospital with about 1 week history of weakness fatigue and jaundice.  She has had some mild  right-sided abdominal pain not severe.  Does not seem to be associated with meals.  Has been having some chalky bowel movements and dark discolored urine.  On arrival she was found to have signs of liver failure including a bilirubin of 14 AST and ALT which went from 1000 to about 2000 each.  Initial CT scan showed cholelithiasis and possible choledocholithiasis which was concerning for obstructive jaundice but subsequent MR CP did not show any evidence of choledocholithiasis.  It also shows a solitary stone within the gallbladder without any evidence of cholecystitis.    Her hepatitis panels are all pending.    Past Medical History:   Diagnosis Date   • Anemia    • Arthritis    • Asthma    • Depression    • History of transfusion    • Kidney stone    • Pneumonia      Past Surgical History:   Procedure Laterality Date   • ABDOMINAL SURGERY N/A     Appendectomy, bilateral salpingectomy, bowel resection   • APPENDECTOMY       History reviewed. No pertinent family history.  Social History     Tobacco Use   • Smoking status: Current Every Day Smoker     Packs/day: 1.00     Years: 40.00     Pack years: 40.00     Types: Cigarettes   Vaping Use   • Vaping Use: Never used   Substance Use Topics   • Alcohol use: Not Currently   • Drug use: Not Currently     Types: IV, Cocaine(coke), Marijuana, Methamphetamines     Comment: Meth use last 3 weeks ago     No medications prior to admission.     nicotine, 1 patch, Transdermal, Q24H  pantoprazole, 40 mg, Intravenous, Q AM  piperacillin-tazobactam, 3.375 g, Intravenous, Q8H  sodium chloride, 10 mL, Intravenous, Q12H  thiamine, 100 mg, Oral, Daily  traZODone, 50 mg, Oral, Nightly  zinc sulfate, 220 mg, Oral, Daily      lactated ringers, 100 mL/hr, Last Rate: 100 mL/hr (05/14/22 1131)      •  acetaminophen **OR** acetaminophen **OR** acetaminophen  •  aluminum-magnesium hydroxide-simethicone  •  ipratropium-albuterol  •  melatonin  •  nitroglycerin  •  ondansetron **OR**  "ondansetron  •  [COMPLETED] Insert peripheral IV **AND** sodium chloride  •  sodium chloride  Patient has no known allergies.    Review of Systems   Constitutional: Positive for fatigue. Negative for chills and fever.   HENT: Negative for sore throat and trouble swallowing.    Eyes: Negative for blurred vision and double vision.   Respiratory: Positive for shortness of breath. Negative for cough.    Cardiovascular: Positive for leg swelling. Negative for chest pain.   Gastrointestinal: Positive for abdominal pain. Negative for abdominal distention, blood in stool, constipation, diarrhea, nausea and vomiting.   Genitourinary: Negative for dysuria and hematuria.   Skin: Positive for color change. Negative for rash and wound.   Neurological: Negative for dizziness and confusion.   Psychiatric/Behavioral: Positive for depressed mood. Negative for agitation.        Objective     Vital Signs and Labs:  Vital Signs Patient Vitals for the past 24 hrs:   BP Temp Temp src Pulse Resp SpO2 Height Weight   05/14/22 0818 116/72 97.2 °F (36.2 °C) Oral 70 18 99 % -- --   05/13/22 2209 149/97 97.6 °F (36.4 °C) Oral 82 18 99 % 157.5 cm (62\") 63.1 kg (139 lb 1.8 oz)   05/13/22 2000 135/75 -- -- 84 -- 99 % -- --   05/13/22 1809 129/86 -- -- 79 -- 100 % -- --   05/13/22 1658 148/72 -- -- 90 -- 100 % -- --   05/13/22 1519 129/83 -- -- 82 -- 100 % -- --       Physical Exam:  Physical Exam  Constitutional:       Appearance: She is well-developed. She is ill-appearing.   HENT:      Head: Normocephalic and atraumatic.   Eyes:      General: Scleral icterus present.         Right eye: No discharge.         Left eye: No discharge.   Neck:      Trachea: No tracheal deviation.   Cardiovascular:      Rate and Rhythm: Normal rate.      Pulses: Normal pulses.   Pulmonary:      Effort: Pulmonary effort is normal. No respiratory distress.   Abdominal:      General: There is no distension.      Palpations: Abdomen is soft.      Comments: Mild " right-sided tenderness palpation   Musculoskeletal:         General: No swelling or tenderness.      Cervical back: Neck supple. No tenderness. No muscular tenderness.   Skin:     General: Skin is warm and dry.      Coloration: Skin is jaundiced.   Neurological:      General: No focal deficit present.      Mental Status: She is alert. Mental status is at baseline.   Psychiatric:         Mood and Affect: Mood normal.         Behavior: Behavior normal.         CBC    Results from last 7 days   Lab Units 05/14/22  0200 05/13/22  1539   WBC 10*3/mm3 6.10 5.90   HEMOGLOBIN g/dL 11.8* 12.8   PLATELETS 10*3/mm3 272 308     Coag   Results from last 7 days   Lab Units 05/14/22  0200 05/13/22  1539   INR  1.51* 1.48*   APTT seconds  --  36.4*     CMP   Results from last 7 days   Lab Units 05/14/22  0200 05/13/22  1539   SODIUM mmol/L 135* 137   POTASSIUM mmol/L 3.9 4.1   CHLORIDE mmol/L 100 102   CO2 mmol/L 23.0 26.0   BUN mg/dL 9 10   CREATININE mg/dL 0.56* 0.61   GLUCOSE mg/dL 84 99   ALBUMIN g/dL 3.10* 3.40*   BILIRUBIN mg/dL 14.3* 14.6*   ALK PHOS U/L 272* 289*   AST (SGOT) U/L 2,095* 1,901*   ALT (SGPT) U/L 2,097* 1,922*   AMYLASE U/L  --  87   LIPASE U/L  --  29   AMMONIA umol/L  --  18     Radiology(recent) CT Abdomen Pelvis With Contrast    Result Date: 5/13/2022  1. Cholelithiasis with dilated gallbladder which may relate to cholecystitis, correlate with associated clinical findings. 2. Common bile duct dilated up to 7 mm with suspicion of a small amount of debris in the proximal CBD concerning for choledocholithiasis. This could be confirmed with MRCP. 3. Prominent dilated pelvic venous vessels asymmetric to the left abutting the uterus which are nonspecific but can be seen with pelvic venous congestion. 4. Small left pleural effusion.     Electronically Signed By-Flo Sylvester MD On:5/13/2022 6:36 PM This report was finalized on [id number] by  Flo Sylvester MD.    MRI abdomen w wo contrast mrcp    Result Date:  5/14/2022  IMPRESSION : No evidence of choledocholithiasis or biliary obstruction. Normal diameter of the common bile duct.  Solitary 9 mm gallstone, nonobstructing. No evidence of cholecystitis.  Diffuse periportal edema, nonspecific. This could relate to hepatitis or hepatic congestion as primary differential considerations.  Electronically Signed By-Mulugeta Aguillon On:5/14/2022 9:36 AM This report was finalized on [id number] by  Mulugeta Aguillon, .      I reviewed the patient's new clinical results.  I reviewed the patient's new imaging results and agree with the interpretation.    Assessment & Plan       Hepatitis    Hyperbilirubinemia    Tobacco abuse    Amphetamine abuse (HCC)    Jaundice      56 y.o. female with significant abnormality of her liver function.  Imaging showing cholelithiasis without cholecystitis      Clinically my initial suspicion was choledocholithiasis causing obstructive jaundice given the sludge seen on the CT scan within the bile duct.  MRI does not show choledocholithiasis.  She has a medical panel evaluating the etiology of her liver failure that is pending.  I discussed her case with gastroenterology.    Unless this does represent choledocholithiasis I would not recommend cholecystectomy as an inpatient.  The gallstone that seen on the imaging is likely silent.  In the setting of hepatitis I certainly would not recommend cholecystectomy.  Continue to follow to see what the etiology is of her liver failure and to  about cholecystectomy timing if indicated.      This note was created using Dragon Voice Recognition software.    Manjinder Pickering MD  05/14/22  14:15 EDT    Electronically signed by Manjinder Pickering MD at 05/14/22 6897

## 2022-05-16 NOTE — NURSING NOTE
Dr Plummer came to unit to see patient.  Patient was sleeping and did not want to be disturbed.  Nurse and MD discussed patient in detail.

## 2022-05-16 NOTE — CASE MANAGEMENT/SOCIAL WORK
Discharge Planning Assessment  [facility]     Patient Name: Grace Birmingham  MRN: [id number]  Today's Date: 5/16/2022    Admit Date: 5/13/2022     Discharge Needs Assessment     Row Name 05/16/22 1554       Living Environment    People in Home significant other    Name(s) of People in Home Florecita Parikh    Current Living Arrangements home    Primary Care Provided by self    Provides Primary Care For no one    Family Caregiver if Needed significant other    Family Caregiver Names Boyfriend Jl    Quality of Family Relationships unable to assess  No family at bedside during rounds    Able to Return to Prior Arrangements yes       Resource/Environmental Concerns    Resource/Environmental Concerns none    Transportation Concerns none       Transition Planning    Patient/Family Anticipates Transition to home    Patient/Family Anticipated Services at Transition none    Transportation Anticipated family or friend will provide       Discharge Needs Assessment    Readmission Within the Last 30 Days no previous admission in last 30 days    Equipment Currently Used at Home none    Concerns to be Addressed denies needs/concerns at this time    Anticipated Changes Related to Illness none    Equipment Needed After Discharge none    Provided Post Acute Provider List? N/A    N/A Provider List Comment Anticipate routine home               Discharge Plan     Row Name 05/16/22 7403       Plan    Plan D/C plan: Anticipate routine home with boyfriend    Patient/Family in Agreement with Plan yes    Plan Comments Met with patient at bedside. Pt lives at home with boyfriend Jl. Pt is IADL, including driving. Pt states Jl will be able to provide transportation at time of d/c. Pt denies use of DME at home. Pt does not have PCP, agreeable for CM to arrange new PCP appt. Pharmacy confirmed. Pt recently lost both of her parents, both have passed over the last 6 months.                   Demographic Summary     Row Name 05/16/22 1551        General Information    Admission Type inpatient    Arrived From emergency department    Referral Source admission list    Reason for Consult discharge planning    Preferred Language English               Functional Status     Row Name 05/16/22 1554       Functional Status    Usual Activity Tolerance moderate    Current Activity Tolerance moderate       Functional Status, IADL    Medications independent    Meal Preparation independent    Housekeeping independent    Laundry independent    Shopping independent                     Met with patient in room wearing PPE: mask.      Maintained distance greater than six feet and spent less than 15 minutes in the room.                 CHRISTINE DUPREE RN

## 2022-05-16 NOTE — PLAN OF CARE
Problem: Adult Inpatient Plan of Care  Goal: Plan of Care Review  Outcome: Ongoing, Progressing  Flowsheets (Taken 5/16/2022 0222)  Outcome Evaluation: Pt pleasant & cooperative. Pt has been resting abed, watching tv throughout this shift. Pt is jaundice, including sclerea. Pt is receiving LR at 100 mL/hr. Pt on a regular diet with Boost + BID. Pt C/O pain x1 so far during this shift with PRN pain medication administered with + effect noted. No additional C/O voiced. Will continue to monitor.  Goal: Patient-Specific Goal (Individualized)  Outcome: Ongoing, Progressing  Goal: Absence of Hospital-Acquired Illness or Injury  Outcome: Ongoing, Progressing  Intervention: Identify and Manage Fall Risk  Recent Flowsheet Documentation  Taken 5/16/2022 0141 by Shama Garcia RN  Safety Promotion/Fall Prevention: safety round/check completed  Taken 5/15/2022 2303 by Shama Garcia RN  Safety Promotion/Fall Prevention: safety round/check completed  Taken 5/15/2022 2110 by Shama Garcia RN  Safety Promotion/Fall Prevention: safety round/check completed  Taken 5/15/2022 1905 by Shama Garcia RN  Safety Promotion/Fall Prevention:   safety round/check completed   nonskid shoes/slippers when out of bed   clutter free environment maintained   assistive device/personal items within reach  Intervention: Prevent Skin Injury  Recent Flowsheet Documentation  Taken 5/15/2022 1905 by Shama Garcia RN  Body Position:   side-lying   left  Intervention: Prevent and Manage VTE (Venous Thromboembolism) Risk  Recent Flowsheet Documentation  Taken 5/15/2022 2309 by Shama Garcia RN  Activity Management: up ad asiya  Taken 5/15/2022 1905 by Shama Garcia RN  Activity Management: activity adjusted per tolerance  Intervention: Prevent Infection  Recent Flowsheet Documentation  Taken 5/16/2022 0141 by Shama Garcia RN  Infection Prevention:   hand hygiene promoted   personal protective equipment utilized   rest/sleep promoted    single patient room provided  Taken 5/15/2022 2303 by Shama Garcia RN  Infection Prevention:   hand hygiene promoted   personal protective equipment utilized   rest/sleep promoted   single patient room provided  Taken 5/15/2022 2110 by Shama Garcia RN  Infection Prevention:   hand hygiene promoted   personal protective equipment utilized   rest/sleep promoted   single patient room provided  Taken 5/15/2022 1905 by Shama Garcia RN  Infection Prevention:   hand hygiene promoted   personal protective equipment utilized   rest/sleep promoted   single patient room provided  Goal: Optimal Comfort and Wellbeing  Outcome: Ongoing, Progressing  Intervention: Monitor Pain and Promote Comfort  Recent Flowsheet Documentation  Taken 5/15/2022 1905 by Shama Garcia RN  Pain Management Interventions:   care clustered   diversional activity provided   pillow support provided   quiet environment facilitated   see MAR   unnecessary movement minimized  Intervention: Provide Person-Centered Care  Recent Flowsheet Documentation  Taken 5/15/2022 1905 by Shama Garcia RN  Trust Relationship/Rapport:   care explained   choices provided   questions answered   questions encouraged   thoughts/feelings acknowledged   reassurance provided  Goal: Readiness for Transition of Care  Outcome: Ongoing, Progressing     Problem: Pain Acute  Goal: Acceptable Pain Control and Functional Ability  Outcome: Ongoing, Progressing  Intervention: Prevent or Manage Pain  Recent Flowsheet Documentation  Taken 5/16/2022 0141 by Shama Garcia RN  Medication Review/Management: medications reviewed  Taken 5/15/2022 2303 by Shama Garcia RN  Medication Review/Management: medications reviewed  Taken 5/15/2022 2110 by Shama Garcia RN  Medication Review/Management: medications reviewed  Taken 5/15/2022 1905 by Shama Garcia RN  Sensory Stimulation Regulation:   care clustered   lighting decreased   television on  Sleep/Rest Enhancement:    awakenings minimized   consistent schedule promoted   room darkened  Medication Review/Management: medications reviewed  Intervention: Develop Pain Management Plan  Recent Flowsheet Documentation  Taken 5/15/2022 1905 by Shama Garcia, RN  Pain Management Interventions:   care clustered   diversional activity provided   pillow support provided   quiet environment facilitated   see MAR   unnecessary movement minimized  Intervention: Optimize Psychosocial Wellbeing  Recent Flowsheet Documentation  Taken 5/15/2022 1905 by Shama Garcia, RN  Supportive Measures: active listening utilized  Diversional Activities: television   Goal Outcome Evaluation:              Outcome Evaluation: Pt pleasant & cooperative. Pt has been resting abed, watching tv throughout this shift. Pt is jaundice, including sclerea. Pt is receiving LR at 100 mL/hr. Pt on a regular diet with Boost + BID. Pt C/O pain x1 so far during this shift with PRN pain medication administered with + effect noted. No additional C/O voiced. Will continue to monitor.

## 2022-05-16 NOTE — NURSING NOTE
Took over care from nurse Duckworth, Pt is A&0 sleeping but easily awakened.  Lunch was delivered but patient requested to be allowed to sleep and will eat later.

## 2022-05-16 NOTE — PLAN OF CARE
Problem: Adult Inpatient Plan of Care  Goal: Absence of Hospital-Acquired Illness or Injury  Intervention: Identify and Manage Fall Risk  Recent Flowsheet Documentation  Taken 5/16/2022 1718 by Katelynn Garza RN  Safety Promotion/Fall Prevention: activity supervised  Taken 5/16/2022 1530 by Katelynn Garza RN  Safety Promotion/Fall Prevention:   activity supervised   safety round/check completed   nonskid shoes/slippers when out of bed  Taken 5/16/2022 1334 by Katelynn Garza RN  Safety Promotion/Fall Prevention:   safety round/check completed   nonskid shoes/slippers when out of bed  Intervention: Prevent Skin Injury  Recent Flowsheet Documentation  Taken 5/16/2022 1530 by Katelynn Garza RN  Body Position: position changed independently  Intervention: Prevent and Manage VTE (Venous Thromboembolism) Risk  Recent Flowsheet Documentation  Taken 5/16/2022 1530 by Katelynn Garza RN  Activity Management: activity adjusted per tolerance  Intervention: Prevent Infection  Recent Flowsheet Documentation  Taken 5/16/2022 1718 by Katelynn Garza RN  Infection Prevention: hand hygiene promoted  Taken 5/16/2022 1530 by Katelynn Garza RN  Infection Prevention: hand hygiene promoted  Taken 5/16/2022 1334 by Katelynn Garza RN  Infection Prevention: hand hygiene promoted  Goal: Optimal Comfort and Wellbeing  Intervention: Provide Person-Centered Care  Recent Flowsheet Documentation  Taken 5/16/2022 1530 by Katelynn Garza RN  Trust Relationship/Rapport:   questions answered   questions encouraged   care explained   Goal Outcome Evaluation:   Pt has been sleeping off and on most of the day.  Pt is wanting to be discharged as soon as possible.  Informed patient that we will evaluate her labs in the morning and then a decision will be made regarding discharge.  No complaints of pain throughout my shift.

## 2022-05-16 NOTE — PROGRESS NOTES
" LOS: 3 days   Patient Care Team:  Provider, No Known as PCP - General      Subjective \"I had a rough night\"    Interval History:     Subjective: Denies nausea or vomiting and is eating well.  Denies abdominal pain but has pressure.      ROS:   No chest pain, shortness of breath, or cough.        Medication Review:     Current Facility-Administered Medications:   •  acetaminophen (TYLENOL) tablet 500 mg, 500 mg, Oral, Q6H PRN **OR** acetaminophen (TYLENOL) 160 MG/5ML solution 500 mg, 500 mg, Oral, Q6H PRN **OR** acetaminophen (TYLENOL) suppository 325 mg, 325 mg, Rectal, Q6H PRN, Kris Ramey MD  •  aluminum-magnesium hydroxide-simethicone (MAALOX MAX) 400-400-40 MG/5ML suspension 15 mL, 15 mL, Oral, Q6H PRN, Kris Ramey MD  •  ipratropium-albuterol (DUO-NEB) nebulizer solution 3 mL, 3 mL, Nebulization, Q6H PRN, Kris Ramey MD  •  lactated ringers infusion, 100 mL/hr, Intravenous, Continuous, Kris Ramey MD, Last Rate: 100 mL/hr at 05/15/22 2305, 100 mL/hr at 05/15/22 2305  •  melatonin tablet 5 mg, 5 mg, Oral, Nightly PRN, Kris Ramey MD, 5 mg at 05/16/22 0158  •  nicotine (NICODERM CQ) 14 MG/24HR patch 1 patch, 1 patch, Transdermal, Q24H, Kris Ramey MD, 1 patch at 05/15/22 2046  •  nitroglycerin (NITROSTAT) SL tablet 0.4 mg, 0.4 mg, Sublingual, Q5 Min PRN, Kris Ramey MD  •  ondansetron (ZOFRAN) tablet 4 mg, 4 mg, Oral, Q6H PRN **OR** ondansetron (ZOFRAN) injection 4 mg, 4 mg, Intravenous, Q6H PRN, Kris Ramey MD  •  oxyCODONE (ROXICODONE) immediate release tablet 5 mg, 5 mg, Oral, Q6H PRN, Jean Pierre Bowman DO, 5 mg at 05/15/22 2233  •  pantoprazole (PROTONIX) EC tablet 40 mg, 40 mg, Oral, Q AM, Kris Ramey MD, 40 mg at 05/16/22 0506  •  [COMPLETED] Insert peripheral IV, , , Once **AND** sodium chloride 0.9 % flush 10 mL, 10 mL, Intravenous, PRN, Jenna Harvey, APRN, 10 mL at 05/13/22 1537  •  sodium chloride 0.9 % flush 10 mL, 10 mL, Intravenous, Q12H, " Kris Ramey MD, 10 mL at 05/15/22 2045  •  sodium chloride 0.9 % flush 10 mL, 10 mL, Intravenous, PRN, Kris Ramey MD  •  thiamine (VITAMIN B-1) tablet 100 mg, 100 mg, Oral, Daily, Sonia Stockton APRN, 100 mg at 05/15/22 1008  •  traZODone (DESYREL) tablet 50 mg, 50 mg, Oral, Nightly, Kris Ramey MD, 50 mg at 05/15/22 2045  •  zinc sulfate (ZINCATE) capsule 220 mg, 220 mg, Oral, Daily, Sonia Stockton APRN, 220 mg at 05/15/22 1008      Objective Resting in bed, ill-appearing but no acute distress, no family present    Vital Signs  Vitals:    05/15/22 0525 05/15/22 0807 05/15/22 1609 05/16/22 0038   BP: 100/74 108/69 149/93 122/66   BP Location: Left arm Left arm  Left arm   Patient Position: Lying Lying  Lying   Pulse: 77 70 69 75   Resp: 16 14  16   Temp: 98.2 °F (36.8 °C)  97.6 °F (36.4 °C) 98.3 °F (36.8 °C)   TempSrc: Oral   Oral   SpO2: 96% 96% 98% 99%   Weight:       Height:           Physical Exam:     General Appearance:    Awake and alert, in no acute distress   Head:    Normocephalic, without obvious abnormality   Eyes:          Conjunctivae normal, icteric sclera   Throat:   No oral lesions, no thrush, oral mucosa moist   Neck:   No adenopathy, supple, no JVD   Lungs:     respirations regular, even and unlabored       Rectal:     Deferred   Extremities:   No edema, no cyanosis   Skin:   No bruising or rash,  jaundice        Results Review:    CBC    Results from last 7 days   Lab Units 05/16/22  0142 05/15/22  1038 05/14/22  0200 05/13/22  1539   WBC 10*3/mm3 6.90 4.90 6.10 5.90   HEMOGLOBIN g/dL 11.0* 11.5* 11.8* 12.8   PLATELETS 10*3/mm3 240 241 272 308     CMP   Results from last 7 days   Lab Units 05/16/22  0142 05/15/22  1038 05/14/22  0200 05/13/22  1539   SODIUM mmol/L 138 137 135* 137   POTASSIUM mmol/L 3.9 3.9 3.9 4.1   CHLORIDE mmol/L 105 103 100 102   CO2 mmol/L 23.0 24.0 23.0 26.0   BUN mg/dL 7 9 9 10   CREATININE mg/dL 0.69 0.58 0.56* 0.61   GLUCOSE mg/dL 114* 142* 84 99    ALBUMIN g/dL 2.70* 2.80* 3.10* 3.40*   BILIRUBIN mg/dL 12.0* 13.7* 14.3* 14.6*   ALK PHOS U/L 230* 251* 272* 289*   AST (SGOT) U/L 1,513* 1,887* 2,095* 1,901*   ALT (SGPT) U/L 1,912* 2,088* 2,097* 1,922*   MAGNESIUM mg/dL  --  1.6 1.8 1.7   AMYLASE U/L  --   --   --  87   LIPASE U/L  --   --   --  29   AMMONIA umol/L  --   --   --  18     Cr Clearance Estimated Creatinine Clearance: 77.6 mL/min (by C-G formula based on SCr of 0.69 mg/dL).  Coag   Results from last 7 days   Lab Units 05/16/22  0142 05/15/22  1038 05/14/22  0200 05/13/22  1539   INR  1.55* 1.65* 1.51* 1.48*   APTT seconds  --   --   --  36.4*     HbA1C No results found for: HGBA1C  Blood Glucose No results found for: POCGLU  Infection     UA    Results from last 7 days   Lab Units 05/13/22  1702   NITRITE UA  Negative   WBC UA /HPF 0-2*   BACTERIA UA /HPF None Seen   SQUAM EPITHEL UA /HPF 0-2     Radiology(recent) No radiology results for the last day       Assessment & Plan   Acute hepatitis   Cholelithiasis  Meth usage/Alcohol usage  Hyponatremia  Mild normocytic anemia  Recent tick bite  GERD     PLAN:  Suspect acute hepatitis B with possible chronic hepatitis C, confirming labs pending.  CMV, tick panel and EBV pending.  LFTs and INR trending down today.  Full liver serology work-up in progress.  Remains on PPI, thiamine and zinc.  Add oral vitamin K.  Good nutrition encouraged.  Continue supportive care and we will watch closely.  If labs continue to improve, may be able to consider discharge home tomorrow.  We will follow.    Electronically signed by BLAINE Marquez, 05/16/22, 9:16 AM EDT.

## 2022-05-17 VITALS
SYSTOLIC BLOOD PRESSURE: 121 MMHG | BODY MASS INDEX: 25.6 KG/M2 | RESPIRATION RATE: 20 BRPM | WEIGHT: 139.11 LBS | HEIGHT: 62 IN | OXYGEN SATURATION: 99 % | DIASTOLIC BLOOD PRESSURE: 74 MMHG | HEART RATE: 70 BPM | TEMPERATURE: 98.7 F

## 2022-05-17 LAB
ALBUMIN SERPL-MCNC: 2.6 G/DL (ref 3.5–5.2)
ALBUMIN/GLOB SERPL: 0.8 G/DL
ALP SERPL-CCNC: 212 U/L (ref 39–117)
ALT SERPL W P-5'-P-CCNC: 1697 U/L (ref 1–33)
ANION GAP SERPL CALCULATED.3IONS-SCNC: 9 MMOL/L (ref 5–15)
AST SERPL-CCNC: 1097 U/L (ref 1–32)
BASOPHILS # BLD AUTO: 0.1 10*3/MM3 (ref 0–0.2)
BASOPHILS NFR BLD AUTO: 1.1 % (ref 0–1.5)
BILIRUB SERPL-MCNC: 11.2 MG/DL (ref 0–1.2)
BUN SERPL-MCNC: 6 MG/DL (ref 6–20)
BUN/CREAT SERPL: 11.5 (ref 7–25)
CALCIUM SPEC-SCNC: 8.7 MG/DL (ref 8.6–10.5)
CHLORIDE SERPL-SCNC: 101 MMOL/L (ref 98–107)
CMV DNA SERPL NAA+PROBE-ACNC: NEGATIVE IU/ML
CMV DNA SERPL NAA+PROBE-LOG IU: NORMAL {LOG_IU}/ML
CO2 SERPL-SCNC: 26 MMOL/L (ref 22–29)
CREAT SERPL-MCNC: 0.52 MG/DL (ref 0.57–1)
DEPRECATED RDW RBC AUTO: 54.7 FL (ref 37–54)
EBV EA-D IGG SER-ACNC: 27.2 U/ML (ref 0–8.9)
EBV VCA IGG SER IA-ACNC: 354 U/ML (ref 0–17.9)
EGFRCR SERPLBLD CKD-EPI 2021: 107.8 ML/MIN/1.73
EOSINOPHIL # BLD AUTO: 0.2 10*3/MM3 (ref 0–0.4)
EOSINOPHIL NFR BLD AUTO: 2.5 % (ref 0.3–6.2)
ERYTHROCYTE [DISTWIDTH] IN BLOOD BY AUTOMATED COUNT: 17.3 % (ref 12.3–15.4)
GLOBULIN UR ELPH-MCNC: 3.4 GM/DL
GLUCOSE SERPL-MCNC: 150 MG/DL (ref 65–99)
HBV DNA SERPL NAA+PROBE-ACNC: NORMAL IU/ML
HBV DNA SERPL NAA+PROBE-LOG IU: 6.52 LOG10 IU/ML
HCT VFR BLD AUTO: 34.4 % (ref 34–46.6)
HCV RNA SERPL NAA+PROBE-ACNC: NORMAL IU/ML
HGB BLD-MCNC: 11.5 G/DL (ref 12–15.9)
INR PPP: 1.58 (ref 0.93–1.1)
LYMPHOCYTES # BLD AUTO: 2 10*3/MM3 (ref 0.7–3.1)
LYMPHOCYTES NFR BLD AUTO: 28.6 % (ref 19.6–45.3)
MCH RBC QN AUTO: 30.1 PG (ref 26.6–33)
MCHC RBC AUTO-ENTMCNC: 33.5 G/DL (ref 31.5–35.7)
MCV RBC AUTO: 89.7 FL (ref 79–97)
MONOCYTES # BLD AUTO: 1 10*3/MM3 (ref 0.1–0.9)
MONOCYTES NFR BLD AUTO: 13.8 % (ref 5–12)
NEUTROPHILS NFR BLD AUTO: 3.7 10*3/MM3 (ref 1.7–7)
NEUTROPHILS NFR BLD AUTO: 54 % (ref 42.7–76)
NRBC BLD AUTO-RTO: 0.1 /100 WBC (ref 0–0.2)
PLATELET # BLD AUTO: 254 10*3/MM3 (ref 140–450)
PMV BLD AUTO: 9.5 FL (ref 6–12)
POTASSIUM SERPL-SCNC: 3.7 MMOL/L (ref 3.5–5.2)
PROT SERPL-MCNC: 6 G/DL (ref 6–8.5)
PROTHROMBIN TIME: 15.9 SECONDS (ref 9.6–11.7)
RBC # BLD AUTO: 3.83 10*6/MM3 (ref 3.77–5.28)
REF LAB TEST REF RANGE: NORMAL
SODIUM SERPL-SCNC: 136 MMOL/L (ref 136–145)
TEST INFORMATION: NORMAL
WBC NRBC COR # BLD: 6.9 10*3/MM3 (ref 3.4–10.8)

## 2022-05-17 PROCEDURE — 85610 PROTHROMBIN TIME: CPT | Performed by: NURSE PRACTITIONER

## 2022-05-17 PROCEDURE — 85025 COMPLETE CBC W/AUTO DIFF WBC: CPT | Performed by: NURSE PRACTITIONER

## 2022-05-17 PROCEDURE — 99232 SBSQ HOSP IP/OBS MODERATE 35: CPT | Performed by: NURSE PRACTITIONER

## 2022-05-17 PROCEDURE — 80053 COMPREHEN METABOLIC PANEL: CPT | Performed by: NURSE PRACTITIONER

## 2022-05-17 RX ADMIN — ACETAMINOPHEN 500 MG: 500 TABLET, FILM COATED ORAL at 08:40

## 2022-05-17 RX ADMIN — Medication 220 MG: at 08:40

## 2022-05-17 RX ADMIN — Medication 5 MG: at 02:20

## 2022-05-17 RX ADMIN — Medication 10 ML: at 08:41

## 2022-05-17 RX ADMIN — Medication 100 MG: at 08:40

## 2022-05-17 RX ADMIN — PANTOPRAZOLE SODIUM 40 MG: 40 TABLET, DELAYED RELEASE ORAL at 05:14

## 2022-05-17 NOTE — DISCHARGE SUMMARY
[facility] Medicine Services  ELOPEMENT AGAINST MEDICAL ADVICE    Patient Name: Grace Birmingham  : 1965  MRN: [id number]    Date of Admission: 2022  Date of Elopement: 2022  Primary Care Physician: Provider, No Known    Consults     Date and Time Order Name Status Description    2022 12:33 AM Inpatient General Surgery Consult Completed         Hospital Course     Presenting Problem:   Jaundice [R17]  Right upper quadrant pain [R10.11]  Elevated liver enzymes [R74.8]    Active Hospital Problems    Diagnosis  POA   • **Hepatitis [K75.9]  Yes   • Jaundice [R17]  Yes   • Hyperbilirubinemia [E80.6]  Yes   • Tobacco abuse [Z72.0]  Yes   • Amphetamine abuse (HCC) [F15.10]  Yes      Resolved Hospital Problems    Diagnosis Date Resolved POA   • Jaundice [R17] 2022 Yes          Hospital Course:  Grace Birmingham is a 56 y.o. female who presented to [facility] on 2022 complaining of jaundiced.  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  She does not see a doctor regularly but she has not been diagnosed with any medical problems in the past and she is currently on no medications.  The patient states that she does smoke about a pack of cigarettes a day and periodically uses methamphetamine but she smokes.  The last time she smoked was 2 to 3 days ago.  The reason she came today she looked in the mirror and saw that her skin had a very yellow tinge color and her eyes were yellow was well.  She came to the emergency room.  She is a little bit of nausea but no vomiting no diarrhea and no real abdominal pain.  In the emergency room she had severely elevated liver enzymes with an AST of 1900 and an ALT of 1900 along with a total bilirubin mostly direct of 14.6.  CT scan showed some biliary dilatation and gallbladder dilatation but no clear stone.  She states that  she does not drink alcohol, has been taking a lot of energy drinks lately, and takes an occasional ibuprofen and Tylenol but no more than is recommended per the bottle and not every day.  We will admit the patient for acute hepatitis and evaluate for possible autoimmune causes while investigating possible biliary disease as well.    Patient's acute and chronic medical conditions were managed as outlined below:    Acute hepatitis from suspected viral hepatitis:  -s/p CT abdomen pelvis 5/13/22  -MRCP 5/14/22--> 9 mm nonobstructing gallstone.  No cholecystitis.  Diffuse periportal edema.  Normal CBD.   -GI consulted--> acute hepatitis work-up lab work pending  -General surgery consulted:--> Ruled out cholecystitis.  Ruled out choledocholithiasis  -Acute viral hepatitis panel--> hepatitis B IgM and hepatitis C antibody positive     Methamphetamine abuse:  -Counseled on cessation.  -Claims past use of IVDA many years ago     Tobacco abuse:  -Continue nicotine patch    **Patient left AMA prior to completion of evaluation and management**      Day of Discharge     HPI:   **Patient left AMA prior to completion of evaluation and management**    Vital Signs:   Temp:  [98.7 °F (37.1 °C)] 98.7 °F (37.1 °C)  Heart Rate:  [70-76] 70  Resp:  [16-20] 20  BP: (121-154)/(74-85) 121/74     Physical Exam (if applicable):      Pertinent  and/or Most Recent Results     Results from last 7 days   Lab Units 05/17/22  0302 05/16/22  0142 05/15/22  1038 05/14/22  0200 05/13/22  1539   WBC 10*3/mm3 6.90 6.90 4.90 6.10 5.90   HEMOGLOBIN g/dL 11.5* 11.0* 11.5* 11.8* 12.8   HEMATOCRIT % 34.4 33.9* 34.4 35.4 38.7   PLATELETS 10*3/mm3 254 240 241 272 308   SODIUM mmol/L 136 138 137 135* 137   POTASSIUM mmol/L 3.7 3.9 3.9 3.9 4.1   CHLORIDE mmol/L 101 105 103 100 102   CO2 mmol/L 26.0 23.0 24.0 23.0 26.0   BUN mg/dL 6 7 9 9 10   CREATININE mg/dL 0.52* 0.69 0.58 0.56* 0.61   GLUCOSE mg/dL 150* 114* 142* 84 99   CALCIUM mg/dL 8.7 8.6 8.7 9.2 9.0      Results from last 7 days   Lab Units 05/17/22  0302 05/16/22  0142 05/15/22  1038 05/14/22  0200 05/13/22  1539   BILIRUBIN mg/dL 11.2* 12.0* 13.7* 14.3* 14.6*   ALK PHOS U/L 212* 230* 251* 272* 289*   ALT (SGPT) U/L 1,697* 1,912* 2,088* 2,097* 1,922*   AST (SGOT) U/L 1,097* 1,513* 1,887* 2,095* 1,901*   PROTIME Seconds 15.9* 15.6* 16.5* 15.2* 14.9*   INR  1.58* 1.55* 1.65* 1.51* 1.48*   APTT seconds  --   --   --   --  36.4*           Invalid input(s): TG, LDLCALC, LDLREALC      Brief Urine Lab Results  (Last result in the past 365 days)      Color   Clarity   Blood   Leuk Est   Nitrite   Protein   CREAT   Urine HCG        05/13/22 1702 Dark Yellow   Clear   Small (1+)   Negative   Negative   Negative                 Microbiology Results Abnormal     Procedure Component Value - Date/Time    COVID PRE-OP / PRE-PROCEDURE SCREENING ORDER (NO ISOLATION) - Swab, Nasopharynx [[id number]]  (Normal) Collected: 05/13/22 2000    Lab Status: Final result Specimen: Swab from Nasopharynx Updated: 05/13/22 2030    Narrative:      The following orders were created for panel order COVID PRE-OP / PRE-PROCEDURE SCREENING ORDER (NO ISOLATION) - Swab, Nasopharynx.  Procedure                               Abnormality         Status                     ---------                               -----------         ------                     COVID-19,CEPHEID/GUTIERREZ,CO...[[id number]]  Normal              Final result                 Please view results for these tests on the individual orders.    COVID-19,CEPHEID/GUTIERREZ,COR/KARISHMA/PAD/NILA IN-HOUSE(OR EMERGENT/ADD-ON),NP SWAB IN TRANSPORT MEDIA 3-4 HR TAT, RT-PCR - Swab, Nasopharynx [[id number]]  (Normal) Collected: 05/13/22 2000    Lab Status: Final result Specimen: Swab from Nasopharynx Updated: 05/13/22 2030     COVID19 Not Detected    Narrative:      Fact sheet for providers: https://www.fda.gov/media/431466/download     Fact sheet for patients: https://www.fda.gov/media/992545/download  Fact  sheet for providers: https://www.fda.gov/media/239283/download    Fact sheet for patients: https://www.fda.gov/media/139463/download    Test performed by PCR.          Imaging Results (All)     Procedure Component Value Units Date/Time    MRI abdomen w wo contrast mrcp [[id number]] Collected: 05/14/22 0925     Updated: 05/14/22 0938    Narrative:         DATE OF EXAM:   5/14/2022 7:30 AM     PROCEDURE:   MRI ABDOMEN W WO CONTRAST MRCP-     INDICATIONS:   Biliary obstruction suspected (Ped 0-17y); R17-Unspecified jaundice;  R10.11-Right upper quadrant pain; R74.8-Abnormal levels of other serum  enzymes; K75.9-Inflammatory liver disease, unspecified; E80.6-Other  disorders of bilirubin metabolism     COMPARISON:  CT abdomen and pelvis 5/13/2022     TECHNIQUE:  Standard noncontrast MR pulse sequence of the abdomen were obtained in  the coronal and axial planes. High-resolution 3-D T2 MRCP images were  obtained to evaluate the biliary tree and pancreatic duct. 3-D MIP  reconstructions were created to aid in the interpretation.     FINDINGS:   No evidence of choledocholithiasis. Normal diameter of the common bile  duct. Pancreatic duct diameter also within normal limits. No evidence of  pancreatitis.     No areas of abnormal enhancement in the liver.     There is a solitary gallstone in the lumen of the gallbladder measuring  9 mm. No evidence of cholecystitis. Wall thickness of the gallbladder  within normal limits.     There is diffuse periportal edema throughout the liver.     No ascites. No pleural effusion.       Impression:      IMPRESSION :   No evidence of choledocholithiasis or biliary obstruction. Normal  diameter of the common bile duct.     Solitary 9 mm gallstone, nonobstructing. No evidence of cholecystitis.     Diffuse periportal edema, nonspecific. This could relate to hepatitis or  hepatic congestion as primary differential considerations.     Electronically Signed ByElaina Aguillon On:5/14/2022 9:36  GREG  This report was finalized on [id number] by  Mulugeta Aguillon, .    CT Abdomen Pelvis With Contrast [[id number]] Collected: 05/13/22 1827     Updated: 05/13/22 1838    Narrative:      CT ABDOMEN PELVIS W CONTRAST-     Date of Exam: 5/13/2022 5:28 PM     Indication: Jaundice, RUQ pain; R17-Unspecified jaundice; R10.11-Right  upper quadrant pain.     Comparison: None available.     Technique: Contiguous axial CT images were obtained from the lung bases  to the pubic symphysis following uneventful administration of Isovue  intravenous contrast. Sagittal and coronal reconstructions were  performed.  Automated exposure control and iterative reconstruction  methods were used.     FINDINGS:  Lower chest demonstrates a small left-sided pleural effusion. There is  mild linear atelectasis/scarring at the left lower lobe. No  consolidation at the right lung base. Negative for right-sided effusion.  Heart size normal. No pericardial effusion.      The liver is normal in size. The gallbladder is distended with  cholelithiasis noted. Mild gallbladder wall thickening. There is mild  periportal edema. The adrenal glands are normal. The pancreas is without  evidence of acute pancreatitis. No pancreatic duct dilatation. The  common bile duct measures 7 mm on image 52 with question of a small  amount of layering debris within the proximal CBD (axial image 52).      Kidneys enhance symmetrically. Negative for hydroureteronephrosis.  Urinary bladder is thin walled. Uterus without acute abnormality. There  are prominent pelvic venous vessels abutting the uterus asymmetric the  left which could relate to pelvic venous congestion. No adnexal mass.     Negative for pneumoperitoneum. No findings of bowel obstruction.  Appendix not visualized, no findings to suggest appendicitis. The portal  vein, splenic vein, superior mesenteric vein are patent. Mild vascular  calcifications involving the abdominal aorta without aneurysm. No  fluid  collection the abdomen or pelvis. There is a transitional lumbosacral  vertebral body. There is mild grade 1 anterolisthesis of L4 on L5 and L5  on S1 related to facet arthritis. Rudimentary disc noted at S1-S2.       Impression:      1. Cholelithiasis with dilated gallbladder which may relate to  cholecystitis, correlate with associated clinical findings.  2. Common bile duct dilated up to 7 mm with suspicion of a small amount  of debris in the proximal CBD concerning for choledocholithiasis. This  could be confirmed with MRCP.  3. Prominent dilated pelvic venous vessels asymmetric to the left  abutting the uterus which are nonspecific but can be seen with pelvic  venous congestion.  4. Small left pleural effusion.              Electronically Signed By-Flo Sylvester MD On:5/13/2022 6:36 PM  This report was finalized on [id number] by  Flo Sylvester MD.                      Pending Labs     Order Current Status    CMV IgG IgM In process    Hepatitis B Surface Antigen In process    Hepatitis C RNA, Quantitative, PCR (graph) In process        Discharge Details     Discharge Disposition:  **Patient left AMA prior to completion of evaluation and management, therefore discharge planning remains incomplete including absence of any needed discharging medications, testing arrangements or follow up unless otherwise specified**      No future appointments.          Electronically signed by Eleonora Plummer MD, 05/17/22, 4:54 PM EDT.

## 2022-05-17 NOTE — PROGRESS NOTES
[facility] Medicine Services Daily Progress Note    Patient Name: Grace Birmingham  : 1965  MRN: [id number]  Primary Care Physician:  Provider, No Known  Date of admission: 2022      Subjective      Chief Complaint: Jaundice    Patient Reports     22: Claims 1 week history of fatigue, jaundice and diarrhea.  NPO.  GI consulted.  Denies excessive alcohol or Tylenol use.    5/15/22: Tolerating diet.  AST and ALT still elevated.  Hepatitis B&C positive.  Awaiting quantitative PCR.    2022: Patient seen and examined in limited fashion as she was sleeping.  Per nurse patient pretended to be sleeping even if awake to avoid interaction.  Nurse reports no new issues or acute events.  Patient is waiting to be discharged tomorrow.    Review of Systems   Reason unable to perform ROS: Pt asleep.          Objective      Vitals:   Temp:  [98.3 °F (36.8 °C)-99 °F (37.2 °C)] 98.7 °F (37.1 °C)  Heart Rate:  [71-76] 76  Resp:  [16-17] 16  BP: (122-154)/(66-85) 154/85    Physical Exam  Vitals and nursing note reviewed.   Constitutional:       General: She is sleeping.   Cardiovascular:      Rate and Rhythm: Normal rate and regular rhythm.   Pulmonary:      Effort: Pulmonary effort is normal.   Abdominal:      General: Bowel sounds are normal.   Musculoskeletal:      Cervical back: Normal range of motion.   Skin:     General: Skin is warm.          Result Review    Result Review:  I have personally reviewed the results from the time of this admission to 2022 20:14 EDT and agree with these findings:  [x]  Laboratory  []  Microbiology  [x]  Radiology  []  EKG/Telemetry   []  Cardiology/Vascular   []  Pathology  [x]  Old records  []  Other:            Assessment & Plan      Brief Patient Summary:    56-year-old female with painless jaundice      nicotine, 1 patch, Transdermal, Q24H  pantoprazole, 40 mg, Oral, Q AM  sodium chloride, 10 mL, Intravenous, Q12H  thiamine, 100 mg, Oral,  Daily  traZODone, 50 mg, Oral, Nightly  zinc sulfate, 220 mg, Oral, Daily       lactated ringers, 100 mL/hr, Last Rate: 100 mL/hr (05/16/22 1630)         Active Hospital Problems:  Active Hospital Problems    Diagnosis    • **Hepatitis    • Jaundice    • Hyperbilirubinemia    • Tobacco abuse    • Amphetamine abuse (HCC)      Acute hepatitis from suspected viral hepatitis:  -s/p CT abdomen pelvis 5/13/22  -MRCP 5/14/22--> 9 mm nonobstructing gallstone.  No cholecystitis.  Diffuse periportal edema.  Normal CBD.   -GI consulted--> acute hepatitis work-up lab work pending  -General surgery consulted:--> Ruled out cholecystitis.  Ruled out choledocholithiasis  -Acute viral hepatitis panel--> hepatitis B IgM and hepatitis C antibody positive    Methamphetamine abuse:  -Counseled on cessation.  -Claims past use of IVDA many years ago    Tobacco abuse:  -Continue nicotine patch    DVT prophylaxis:  Mechanical DVT prophylaxis orders are present.    CODE STATUS:    Level Of Support Discussed With: Patient  Code Status (Patient has no pulse and is not breathing): CPR (Attempt to Resuscitate)  Medical Interventions (Patient has pulse or is breathing): Full Support      Disposition:  I expect patient to be discharged defer.    This patient has been examined wearing appropriate Personal Protective Equipment and discussed with nursing. 05/16/22      Electronically signed by Eleonora Plummer MD, 05/16/22, 20:14 EDT.  [facility]

## 2022-05-17 NOTE — CASE MANAGEMENT/SOCIAL WORK
Case Management Discharge Note      Final Note: Left AMA    Provided Post Acute Provider List?: N/A  N/A Provider List Comment: Anticipate routine home    Selected Continued Care - Discharged on 5/17/2022 Admission date: 5/13/2022 - Discharge disposition: Left Against Medical Advice            Transportation Services  Private: Car    Final Discharge Disposition Code: 07 - left AMA

## 2022-05-17 NOTE — PAYOR COMM NOTE
"** RECONSIDERATION **  CASE# [id number]    INPATIENT ORDER 5/13/22  PATIENT LEFT AMA ON 5/17/22  -----------  RECONSIDERATION PENDING:   PLEASE FAX OR CALL DETERMINATION TO CONTACT BELOW:     THANK YOU,    ANNABELLE Sharma, RN  Utilization Review  [facility]  Phone: [phone]  Fax: [phone]      NPI: [id number]  Tax ID: [id number]    Grace Fernando (56 y.o. Female)             Date of Birth   1965    Social Security Number       Address   [address] [place] [address] [place] [address]    Home Phone   [phone]    MRN   [id number]       Druze   None    Marital Status   Single                            Admission Date   5/13/22    Admission Type   Emergency    Admitting Provider   Eleonora Plummer MD    Attending Provider       Department, Room/Bed   [facility] 3A MEDICAL INPATIENT, 312/1       Discharge Date   5/17/2022    Discharge Disposition   Left Against Medical Advice    Discharge Destination                               Attending Provider: (none)   Allergies: No Known Allergies    Isolation: None   Infection: None   Code Status: Prior   Advance Care Planning Activity    Ht: 157.5 cm (62\")   Wt: 63.1 kg (139 lb 1.8 oz)    Admission Cmt: None   Principal Problem: Hepatitis [K75.9]                 Active Insurance as of 5/13/2022     Primary Coverage     Payor Plan Insurance Group Employer/Plan Group    [id number]     Payor Plan Address Payor Plan Phone Number Payor Plan Fax Number Effective Dates    MAIL STOP:   3/1/2019 - None Entered    [address] [address]       [facility] [address]       Subscriber Name Subscriber Birth Date Member ID       GRACE FERNANDO 1965 [id number]                 Emergency Contacts      (Rel.) Home Phone Work Phone Mobile Phone    DOTTIE MCKENZIE (Significant Other) [phone] -- [phone]               History & Physical      Kris Ramey MD at 05/13/22 2029      "         [facility] Medicine Services      Patient Name: Grace Birmingham  : 1965  MRN: [id number]  Primary Care Physician:  Provider, No Known  Date of admission: 2022      Subjective      Chief Complaint: Jaundice    History of Present Illness: Grace Birmingham is a 56 y.o. female who presented to [facility] on 2022 complaining of jaundiced.  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  She does not see a doctor regularly but she has not been diagnosed with any medical problems in the past and she is currently on no medications.  The patient states that she does smoke about a pack of cigarettes a day and periodically uses methamphetamine but she smokes.  The last time she smoked was 2 to 3 days ago.  The reason she came today she looked in the mirror and saw that her skin had a very yellow tinge color and her eyes were yellow was well.  She came to the emergency room.  She is a little bit of nausea but no vomiting no diarrhea and no real abdominal pain.  In the emergency room she had severely elevated liver enzymes with an AST of 1900 and an ALT of 1900 along with a total bilirubin mostly direct of 14.6.  CT scan showed some biliary dilatation and gallbladder dilatation but no clear stone.  She states that she does not drink alcohol, has been taking a lot of energy drinks lately, and takes an occasional ibuprofen and Tylenol but no more than is recommended per the bottle and not every day.  We will admit the patient for acute hepatitis and evaluate for possible autoimmune causes while investigating possible biliary disease as well.      ROS jaundice, decreased appetite, fatigue, did have some URI symptoms a few days ago, slight abdominal distention, nausea but no vomiting and no diarrhea or constipation, she denies shortness of breath, fever, chills, diarrhea or  constipation, does report some mild peripheral edema, denies rash, altered mental status, periodic headaches but no different from her baseline, denies increased mucus production, shortness of breath, chest pain and the rest the 15 essential review of systems have been reviewed and are negative    Personal History     Past Medical History:   Diagnosis Date   • Anemia    • Arthritis    • Asthma    • Depression    • History of transfusion    • Kidney stone    • Pneumonia        Past Surgical History:   Procedure Laterality Date   • ABDOMINAL SURGERY N/A     Appendectomy, bilateral salpingectomy, bowel resection   • APPENDECTOMY         Family History: Her sister has breast cancer, both of her grandparents had diabetes on both sides of the family    Social History:  reports that she has been smoking cigarettes. She has a 40.00 pack-year smoking history. She does not have any smokeless tobacco history on file. She reports previous alcohol use. She reports previous drug use. Drugs: IV, Cocaine(coke), Marijuana, and Methamphetamines.  She states that she denies use of IV medications    Home Medications: She is not on any current home medications she does drink a lot of energy drinks  Prior to Admission Medications     None            Allergies:  No Known Allergies    Objective      Vitals:   Temp:  [97.8 °F (36.6 °C)] 97.8 °F (36.6 °C)  Heart Rate:  [79-90] 84  Resp:  [20] 20  BP: (129-154)/(72-91) 135/75    Physical Exam   General no distress  Head atraumatic  Eyes jaundice ocular motion intact pupils are reactive  Oropharynx membranes slightly dry no erythema jaundice was obvious  Neck no thyromegaly lymphadenopathy  Pulmonary lungs were clear to auscultation bilateral no accessory muscle use  Cardiac regular rate and rhythm could not appreciate any murmurs no edema  Abdomen mild distention there was no hepatosplenomegaly no guarding no rebound bowel sounds were present no pain on palpation  Extremities symmetric slight  edema warm to the touch able to move all extremities  Neuro cranial nerves II through XII intact and no obvious focal deficit  Psych patient was alert and oriented x4 answers questions appropriately appropriate mood and affect  Derm skin was jaundiced but did not perceive any rash    Result Review    Result Review:  I have personally reviewed the results from the time of this admission to 5/13/2022 20:30 EDT and agree with these findings:  [x]  Laboratory  [x]  Microbiology  [x]  Radiology  []  EKG/Telemetry   []  Cardiology/Vascular   []  Pathology  []  Old records  []  Other:  Most notable findings include:   Sodium 137, potassium 4.1, bicarb 26, creatinine 0.61, BUN 10, calcium 9, alkaline phosphatase 289, total protein 6.9, ALT 1900, AST 1900, total bilirubin 14.6, albumin 3.4, the bilirubin greater than 10 was direct  Ammonia level 18, amylase 87, lipase 29, magnesium 1.7  PTT 36.4, PT 14.9, INR 1.48  CBC White blood cells 5900, hemoglobin 12.8, platelets 308,000  Urine drug screen was positive for amphetamines  Monospot was negative Tylenol level was less than 5 undetectable    IMPRESSION:  1. Cholelithiasis with dilated gallbladder which may relate to  cholecystitis, correlate with associated clinical findings.  2. Common bile duct dilated up to 7 mm with suspicion of a small amount  of debris in the proximal CBD concerning for choledocholithiasis. This  could be confirmed with MRCP.  3. Prominent dilated pelvic venous vessels asymmetric to the left  abutting the uterus which are nonspecific but can be seen with pelvic  venous congestion.  4. Small left pleural effusion.  Assessment & Plan        Active Hospital Problems:  Active Hospital Problems    Diagnosis    • **Hepatitis    • Hyperbilirubinemia    • Tobacco abuse    • Amphetamine abuse (HCC)      Plan:   1.  Acute hepatitis -could be biliary in nature she does have cholelithiasis, MRCP has been ordered.  Also could be acute hepatitis so hepatitis panel  A, B, and C has been ordered.  Could be methamphetamine induced or other medications or over-the-counter medications.  But she does not state that she abuses alcohol or Tylenol or NSAIDs.  And looking for autoimmune so TAD, IgG4, antimitochondrial antibodies and anti-smooth muscle antibodies made the patient n.p.o. consulted general surgery and GI  2.  Hyperbilirubinemia see #1  3.  Tobacco abuse counseled her on cessation given nicotine patch  4.  Amphetamine abuse discussed the importance of abstinence    DVT prophylaxis:  Medical DVT prophylaxis orders are present.    CODE STATUS:    Level Of Support Discussed With: Patient  Code Status (Patient has no pulse and is not breathing): CPR (Attempt to Resuscitate)  Medical Interventions (Patient has pulse or is breathing): Full Support    Admission Status:  I believe this patient meets inpatient status.    I discussed the patient's findings and my recommendations with patient.    This patient has been examined wearing appropriate Personal Protective Equipment and discussed with . 05/13/22      Signature:     Electronically signed by Kris Ramey MD at 05/13/22 2049          Emergency Department Notes      Jenna Harvey APRN at 05/13/22 1518          Subjective   Chief Complaint: Jaundice      HPI: Patient is a 56-year-old female presents to the ER today for yellowing skin and eyes and intermittent right upper quadrant pain.  She denies that she has a history of alcoholism, does states she may drink 1-2 daiquiri's once a week. she does report she has used meth in the last 3 weeks.  She states that she noticed her symptoms approximately a week ago and they have progressively gotten worse.  She reports an increased anxiety and stress over the last several months as her mother and father passed away recently.  She has had several abdominal surgeries including ruptured appendix when she was 15 that required bowel repair.    PCP: None          Review of Systems    Constitutional: Positive for fatigue. Negative for chills and fever.   HENT: Negative for sore throat and trouble swallowing.         Mouth swelling   Eyes: Negative for photophobia and visual disturbance.   Respiratory: Negative for cough and shortness of breath.    Cardiovascular: Negative for chest pain and palpitations.   Gastrointestinal: Positive for abdominal pain. Negative for blood in stool, constipation, diarrhea, nausea and vomiting.   Endocrine: Negative for polyuria.   Genitourinary: Negative for dysuria, flank pain and hematuria.   Musculoskeletal: Negative.    Skin: Positive for color change.   Neurological: Negative for dizziness, light-headedness and headaches.   Hematological: Negative.    Psychiatric/Behavioral: Negative.        Past Medical History:   Diagnosis Date   • Anemia    • Arthritis    • Asthma    • Depression    • History of transfusion    • Kidney stone    • Pneumonia        No Known Allergies    Past Surgical History:   Procedure Laterality Date   • ABDOMINAL SURGERY N/A     Appendectomy, bilateral salpingectomy, bowel resection   • APPENDECTOMY         History reviewed. No pertinent family history.    Social History     Socioeconomic History   • Marital status: Single   Tobacco Use   • Smoking status: Current Every Day Smoker     Packs/day: 1.00     Years: 40.00     Pack years: 40.00     Types: Cigarettes   Vaping Use   • Vaping Use: Never used   Substance and Sexual Activity   • Alcohol use: Not Currently   • Drug use: Not Currently     Types: IV, Cocaine(coke), Marijuana, Methamphetamines     Comment: Marijuana use last 3 weeks ago   • Sexual activity: Yes     Partners: Male     Birth control/protection: None           Objective   Physical Exam  Vitals reviewed.   Constitutional:       Appearance: She is not ill-appearing or toxic-appearing.   HENT:      Head: Normocephalic.      Mouth/Throat:      Mouth: Mucous membranes are moist.      Pharynx: Oropharynx is clear.   Eyes:       General: Lids are normal.      Extraocular Movements: Extraocular movements intact.      Pupils: Pupils are equal, round, and reactive to light.      Comments: Scleral icterus   Cardiovascular:      Rate and Rhythm: Normal rate and regular rhythm.      Pulses: Normal pulses.      Heart sounds: Normal heart sounds. No murmur heard.  Pulmonary:      Effort: Pulmonary effort is normal.      Breath sounds: Normal breath sounds. No wheezing.   Abdominal:      General: Bowel sounds are normal. There is no distension.      Palpations: Abdomen is soft. There is hepatomegaly.      Tenderness: There is no abdominal tenderness.   Musculoskeletal:         General: No tenderness. Normal range of motion.      Cervical back: Normal range of motion.   Skin:     General: Skin is warm and dry.      Coloration: Skin is jaundiced.      Findings: No bruising.   Neurological:      General: No focal deficit present.      Mental Status: She is alert and oriented to person, place, and time. Mental status is at baseline.      Motor: No weakness.   Psychiatric:         Attention and Perception: Attention normal.         Mood and Affect: Mood is anxious.         Speech: Speech normal.         Behavior: Behavior is cooperative.         Thought Content: Thought content normal.         Cognition and Memory: Cognition normal.         Judgment: Judgment normal.         Procedures          ED Course  ED Course as of 05/13/22 2021   Fri May 13, 2022   1453 Patient evaluated after being placed in a room from triage [BH]   1631 CT Abdomen Pelvis With Contrast  Pending testing  [BH]   1722 CT Abdomen Pelvis With Contrast  Pending testing.  [BH]   1722 Bilirubin, UA(!): Moderate (2+) [BH]   1723 Bilirubin, Direct(!): >10.0 [BH]   1723 ALT (SGPT)(!): 1,922 [BH]   1723 AST (SGOT)(!): 1,901 [BH]   1723 Alkaline Phosphatase(!): 289 [BH]   1723 Total Bilirubin(!): 14.6 [BH]   1723 Albumin(!): 3.40 [BH]   1737 Amphet/Methamphet, Screen(!): Positive  Patient  "admitted to meth abuse approximately 3 weeks ago.  []   1740 CT Abdomen Pelvis With Contrast  Pending results []   1832 CT Abdomen Pelvis With Contrast  Pending results   []   1918 Patient reports last intake was approximately 1 hour ago with sips of a drink.  She dorsey not had a meal today.  []   1956 Spoke with Dr. Bautista with gastroenterology who advised that patient would need an TAD as well as a hepatic profile.  He requested that a PT/INR and CMP be completed in the morning as well as IV fluids.  Dr. Ramey was notified. []      ED Course User Index  [] Jenna Harvey, APRN           /75 (BP Location: Left arm, Patient Position: Lying)   Pulse 84   Temp 97.8 °F (36.6 °C) (Oral)   Resp 20   Ht 157.5 cm (62\")   Wt 63.1 kg (139 lb 1.8 oz)   SpO2 99%   BMI 25.44 kg/m²   Labs Reviewed   COMPREHENSIVE METABOLIC PANEL - Abnormal; Notable for the following components:       Result Value    Albumin 3.40 (*)     ALT (SGPT) 1,922 (*)     AST (SGOT) 1,901 (*)     Alkaline Phosphatase 289 (*)     Total Bilirubin 14.6 (*)     All other components within normal limits    Narrative:     GFR Normal >60  Chronic Kidney Disease <60  Kidney Failure <15     PROTIME-INR - Abnormal; Notable for the following components:    Protime 14.9 (*)     INR 1.48 (*)     All other components within normal limits   APTT - Abnormal; Notable for the following components:    PTT 36.4 (*)     All other components within normal limits   URINALYSIS W/ MICROSCOPIC IF INDICATED (NO CULTURE) - Abnormal; Notable for the following components:    Color, UA Dark Yellow (*)     Bilirubin, UA Moderate (2+) (*)     Blood, UA Small (1+) (*)     All other components within normal limits   URINE DRUG SCREEN - Abnormal; Notable for the following components:    Amphet/Methamphet, Screen Positive (*)     All other components within normal limits    Narrative:     Negative Thresholds Per Drugs Screened:    Amphetamines                 500 " ng/ml  Barbiturates                 200 ng/ml  Benzodiazepines              100 ng/ml  Cocaine                      300 ng/ml  Methadone                    300 ng/ml  Opiates                      300 ng/ml  Oxycodone                    100 ng/ml  THC                           50 ng/ml    The Normal Value for all drugs tested is negative. This report includes final unconfirmed screening results to be used for medical treatment purposes only. Unconfirmed results must not be used for non-medical purposes such as employment or legal testing. Clinical consideration should be applied to any drug of abuse test, particularly when unconfirmed results are used.          All urine drugs of abuse requests without chain of custody are for medical screening purposes only.  False positives are possible.     CBC WITH AUTO DIFFERENTIAL - Abnormal; Notable for the following components:    RDW 17.0 (*)     Monocyte % 16.7 (*)     Monocytes, Absolute 1.00 (*)     All other components within normal limits   BILIRUBIN, DIRECT - Abnormal; Notable for the following components:    Bilirubin, Direct >10.0 (*)     All other components within normal limits   URINALYSIS, MICROSCOPIC ONLY - Abnormal; Notable for the following components:    RBC, UA 0-2 (*)     WBC, UA 0-2 (*)     All other components within normal limits   LIPASE - Normal   AMYLASE - Normal   AMMONIA - Normal   ACETAMINOPHEN LEVEL - Normal    Narrative:     Acetaminophen Therapeutic Range  5-20 ug/mL      Hours after ingestion            Toxic Value    4 Hours                           150 ug/mL    8 Hours                            70 ug/mL   12 Hours                            40 ug/mL   16 Hours                            20 ug/mL    These values apply to a single ingestion only.    MAGNESIUM - Normal   COVID PRE-OP / PRE-PROCEDURE SCREENING ORDER (NO ISOLATION)    Narrative:     The following orders were created for panel order COVID PRE-OP / PRE-PROCEDURE SCREENING ORDER  (NO ISOLATION) - Swab, Nasopharynx.  Procedure                               Abnormality         Status                     ---------                               -----------         ------                     COVID-19,CEPHEID/GUTIERREZ,CO...[[id number]]                      In process                   Please view results for these tests on the individual orders.   COVID-19,CEPHEID/GUTIERREZ,COR/KARISHMA/PAD/NILA IN-HOUSE,NP SWAB IN TRANSPORT MEDIA 3-4 HR TAT, RT-PCR   ETHANOL    Narrative:     Plasma Ethanol Clinical Symptoms:    ETOH (%)               Clinical Symptom  .01-.05              No apparent influence  .03-.12              Euphoria, Diminished judgment and attention   .09-.25              Impaired comprehension, Muscle incoordination  .18-.30              Confusion, Staggered gait, Slurred speech  .25-.40              Markedly decreased response to stimuli, unable to stand or                        walk, vomitting, sleep or stupor  .35-.50              Comatose, Anesthesia, Subnormal body temperature       TAD   HEPATIC FUNCTION PANEL   HEPATITIS PANEL, ACUTE   HIV-1 AND HIV-2 ANTIBODIES    Narrative:     The following orders were created for panel order HIV-1 & HIV-2 Antibodies.  Procedure                               Abnormality         Status                     ---------                               -----------         ------                     HIV-1 / O / 2 Ag / Antib...[[id number]]                                                   Please view results for these tests on the individual orders.   MONONUCLEOSIS SCREEN   HIV-1/O/2 ANTIGEN/ANTIBODY, 4TH GENERATION   CBC AND DIFFERENTIAL    Narrative:     The following orders were created for panel order CBC & Differential.  Procedure                               Abnormality         Status                     ---------                               -----------         ------                     CBC Auto Differential[[id number]]        Abnormal            Final result                  Please view results for these tests on the individual orders.     Medications   sodium chloride 0.9 % flush 10 mL (10 mL Intravenous Given 5/13/22 1537)   nitroglycerin (NITROSTAT) SL tablet 0.4 mg (has no administration in time range)   sodium chloride 0.9 % flush 10 mL (has no administration in time range)   sodium chloride 0.9 % flush 10 mL (has no administration in time range)   acetaminophen (TYLENOL) tablet 650 mg (has no administration in time range)     Or   acetaminophen (TYLENOL) 160 MG/5ML solution 650 mg (has no administration in time range)     Or   acetaminophen (TYLENOL) suppository 650 mg (has no administration in time range)   aluminum-magnesium hydroxide-simethicone (MAALOX MAX) 400-400-40 MG/5ML suspension 15 mL (has no administration in time range)   ondansetron (ZOFRAN) tablet 4 mg (has no administration in time range)     Or   ondansetron (ZOFRAN) injection 4 mg (has no administration in time range)   melatonin tablet 5 mg (has no administration in time range)   Enoxaparin Sodium (LOVENOX) syringe 40 mg (has no administration in time range)   sodium chloride 0.9 % bolus 1,000 mL (0 mL Intravenous Stopped 5/13/22 1658)   iopamidol (ISOVUE-370) 76 % injection 100 mL (100 mL Intravenous Given 5/13/22 1733)     CT Abdomen Pelvis With Contrast    Result Date: 5/13/2022  1. Cholelithiasis with dilated gallbladder which may relate to cholecystitis, correlate with associated clinical findings. 2. Common bile duct dilated up to 7 mm with suspicion of a small amount of debris in the proximal CBD concerning for choledocholithiasis. This could be confirmed with MRCP. 3. Prominent dilated pelvic venous vessels asymmetric to the left abutting the uterus which are nonspecific but can be seen with pelvic venous congestion. 4. Small left pleural effusion.     Electronically Signed By-Flo Sylvester MD On:5/13/2022 6:36 PM This report was finalized on [id number] by  Flo Sylvester MD.                                           MDM  Number of Diagnoses or Management Options  Elevated liver enzymes  Jaundice  Right upper quadrant pain  Diagnosis management comments: While in the emergency room an IV was established and labs were obtained.  Patient was given an IV fluid bolus.  She was without pain.  Labs were indicative of elevated liver enzymes, ALT 1922, AST 1901, alk phos was 289, total bilirubin was 14.6 and direct bili was greater than 10.  Patient's white count 5.9.  A call was placed to Dr. Pickering with general surgery who advised that patient would need a GI consult for possible MRCP, but advised that she may need a cholecystectomy following.  Spoke with Dr. Bautista with GI who suggested that an TAD and a hepatic profile be added to the patient's labs, these were added and are pending at time of admission..  Spoke with Dr. Ramey with the hospitalist group who agreed to admission, admitting physician Dr. Juárez.      CBC, CMP and PT and INR orders were placed for a.m. draw.    Chart review: No recent visits that are pertinent to today's complaint    Comorbidities: Unknown patient reports she does not have a primary care    Differentials: Cholecystitis, cirrhosis, liver dysfunction, biliary obstruction not all inclusive of differentials considered    Appropriate PPE worn throughout the care of this patient.           Amount and/or Complexity of Data Reviewed  Clinical lab tests: reviewed  Tests in the radiology section of CPT®: reviewed    Patient Progress  Patient progress: stable      Final diagnoses:   Jaundice   Right upper quadrant pain   Elevated liver enzymes       ED Disposition  ED Disposition     ED Disposition   Decision to Admit    Condition   --    Comment   Level of Care: Med/Surg [1]   Admitting Physician: SKYLAR JUÁREZ [[id number]]   Attending Physician: SKYLAR JUÁREZ [[id number]]               No follow-up provider specified.       Medication List      No changes were made to your prescriptions  during this visit.          Jenna Harvey APRN  05/13/22 2022      Electronically signed by Jenna Harvey APRN at 05/13/22 2022       Vital Signs (last day) before discharge     Date/Time Temp Temp src Pulse Resp BP Patient Position SpO2    05/17/22 0752 -- -- 70 20 121/74 Lying 99    05/17/22 0438 98.7 (37.1) Oral 75 16 135/83 Lying 99    05/16/22 2003 98.7 (37.1) Oral 76 16 154/85 Lying 98    05/16/22 1647 99 (37.2) Oral 71 17 130/80 Lying 99    05/16/22 0038 98.3 (36.8) Oral 75 16 122/66 Lying 99            Facility-Administered Medications as of 5/17/2022   Medication Dose Route Frequency Provider Last Rate Last Admin   • [COMPLETED] gadoteridol (PROHANCE) injection 20 mL  20 mL Intravenous Once in imaging Kris Ramey MD   20 mL at 05/14/22 0742   • [COMPLETED] iopamidol (ISOVUE-370) 76 % injection 100 mL  100 mL Intravenous Once in imaging Ricardo Harvey MD   100 mL at 05/13/22 1733   • [COMPLETED] magnesium sulfate 2g/50 mL (PREMIX) infusion  2 g Intravenous Once Kris Ramey MD   2 g at 05/13/22 2234   • [COMPLETED] piperacillin-tazobactam (ZOSYN) IVPB 3.375 g in 100 mL NS (CD)  3.375 g Intravenous Once Sonia Stockton APRN   3.375 g at 05/14/22 1052   • [COMPLETED] sodium chloride 0.9 % bolus 1,000 mL  1,000 mL Intravenous Once Jenna Harvey APRN   Stopped at 05/13/22 1658   • [COMPLETED] vitamin K1 (PHYTONADIONE) 1 MG/1 ML oral solution 5 mg  5 mg Oral Once Kayla Parker APRN   5 mg at 05/16/22 1213     Lab Results (last 48 hours)     Procedure Component Value Units Date/Time    Comprehensive Metabolic Panel [[id number]]  (Abnormal) Collected: 05/17/22 0302    Specimen: Blood Updated: 05/17/22 0356     Glucose 150 mg/dL      BUN 6 mg/dL      Creatinine 0.52 mg/dL      Sodium 136 mmol/L      Potassium 3.7 mmol/L      Chloride 101 mmol/L      CO2 26.0 mmol/L      Calcium 8.7 mg/dL      Total Protein 6.0 g/dL      Albumin 2.60 g/dL      ALT (SGPT) 1,697 U/L      AST (SGOT)  1,097 U/L      Alkaline Phosphatase 212 U/L      Total Bilirubin 11.2 mg/dL      Globulin 3.4 gm/dL      A/G Ratio 0.8 g/dL      BUN/Creatinine Ratio 11.5     Anion Gap 9.0 mmol/L      eGFR 107.8 mL/min/1.73      Comment: National Kidney Foundation and American Society of Nephrology (ASN) Task Force recommended calculation based on the Chronic Kidney Disease Epidemiology Collaboration (CKD-EPI) equation refit without adjustment for race.       Narrative:      GFR Normal >60  Chronic Kidney Disease <60  Kidney Failure <15      Protime-INR [[id number]]  (Abnormal) Collected: 05/17/22 0302    Specimen: Blood Updated: 05/17/22 0322     Protime 15.9 Seconds      INR 1.58    CBC & Differential [[id number]]  (Abnormal) Collected: 05/17/22 0302    Specimen: Blood Updated: 05/17/22 0313    Narrative:      The following orders were created for panel order CBC & Differential.  Procedure                               Abnormality         Status                     ---------                               -----------         ------                     CBC Auto Differential[[id number]]        Abnormal            Final result                 Please view results for these tests on the individual orders.    CBC Auto Differential [[id number]]  (Abnormal) Collected: 05/17/22 0302    Specimen: Blood Updated: 05/17/22 0313     WBC 6.90 10*3/mm3      RBC 3.83 10*6/mm3      Hemoglobin 11.5 g/dL      Hematocrit 34.4 %      MCV 89.7 fL      MCH 30.1 pg      MCHC 33.5 g/dL      RDW 17.3 %      RDW-SD 54.7 fl      MPV 9.5 fL      Platelets 254 10*3/mm3      Neutrophil % 54.0 %      Lymphocyte % 28.6 %      Monocyte % 13.8 %      Eosinophil % 2.5 %      Basophil % 1.1 %      Neutrophils, Absolute 3.70 10*3/mm3      Lymphocytes, Absolute 2.00 10*3/mm3      Monocytes, Absolute 1.00 10*3/mm3      Eosinophils, Absolute 0.20 10*3/mm3      Basophils, Absolute 0.10 10*3/mm3      nRBC 0.1 /100 WBC     IgG 4 [[id number]] Collected: 05/14/22 0203    Specimen:  Blood Updated: 05/16/22 2307     IgG Subclass 4 18 mg/dL     Narrative:      Performed at:  [address] [place] [address]  [id number]  : Harry Soto PhD, Phone:  [phone]    Anti-Smooth Muscle Antibody Titer [[id number]]  (Abnormal) Collected: 05/14/22 0200    Specimen: Blood Updated: 05/16/22 1510     Smooth Muscle Ab 26 Units      Comment:                  Negative                     0 - 19                   Weak positive               20 - 30                   Moderate to strong positive     >30   Actin Antibodies are found in 52-85% of patients with   autoimmune hepatitis or chronic active hepatitis and   in 22% of patients with primary biliary cirrhosis.       Narrative:      Performed at:  [address]  [id number]  : Harry Soto PhD, Phone:  [phone]    Mitochondrial Antibodies, M2 [[id number]] Collected: 05/14/22 0200    Specimen: Blood Updated: 05/16/22 1510     Mitochondrial Ab <20.0 Units      Comment:                                 Negative    0.0 - 20.0                                  Equivocal  20.1 - 24.9                                  Positive         >24.9  Mitochondrial (M2) Antibodies are found in 90-96% of  patients with primary biliary cirrhosis.       Narrative:      Performed at:  [address] [place] [address]  [id number]  : Harry Soto PhD, Phone:  [phone]    TAD [[id number]]  (Normal) Collected: 05/14/22 0200    Specimen: Blood Updated: 05/16/22 1202     Antinuclear Antibodies (TAD) Negative    Xavi-Barr Virus VCA Antibody Panel [[id number]]  (Abnormal) Collected: 05/14/22 0200    Specimen: Blood Updated: 05/16/22 1100     EBV VCA IgM Negative     EBV VCA IgG Positive     EBV Nuclear Ag Positive     EBV Early Ag Negative    Comprehensive Metabolic Panel [[id number]]  (Abnormal) Collected: 05/16/22 0142    Specimen: Blood Updated: 05/16/22 0349     Glucose  114 mg/dL      BUN 7 mg/dL      Creatinine 0.69 mg/dL      Sodium 138 mmol/L      Potassium 3.9 mmol/L      Chloride 105 mmol/L      CO2 23.0 mmol/L      Calcium 8.6 mg/dL      Total Protein 5.8 g/dL      Albumin 2.70 g/dL      ALT (SGPT) 1,912 U/L      AST (SGOT) 1,513 U/L      Alkaline Phosphatase 230 U/L      Total Bilirubin 12.0 mg/dL      Globulin 3.1 gm/dL      A/G Ratio 0.9 g/dL      BUN/Creatinine Ratio 10.1     Anion Gap 10.0 mmol/L      eGFR 100.7 mL/min/1.73      Comment: National Kidney Foundation and American Society of Nephrology (ASN) Task Force recommended calculation based on the Chronic Kidney Disease Epidemiology Collaboration (CKD-EPI) equation refit without adjustment for race.       Narrative:      GFR Normal >60  Chronic Kidney Disease <60  Kidney Failure <15      Protime-INR [[id number]]  (Abnormal) Collected: 05/16/22 0142    Specimen: Blood Updated: 05/16/22 0318     Protime 15.6 Seconds      INR 1.55    CBC & Differential [[id number]]  (Abnormal) Collected: 05/16/22 0142    Specimen: Blood Updated: 05/16/22 0313    Narrative:      The following orders were created for panel order CBC & Differential.  Procedure                               Abnormality         Status                     ---------                               -----------         ------                     CBC Auto Differential[[id number]]        Abnormal            Final result                 Please view results for these tests on the individual orders.    CBC Auto Differential [[id number]]  (Abnormal) Collected: 05/16/22 0142    Specimen: Blood Updated: 05/16/22 0313     WBC 6.90 10*3/mm3      RBC 3.76 10*6/mm3      Hemoglobin 11.0 g/dL      Hematocrit 33.9 %      MCV 90.0 fL      MCH 29.1 pg      MCHC 32.4 g/dL      RDW 17.3 %      RDW-SD 53.8 fl      MPV 10.7 fL      Platelets 240 10*3/mm3      Neutrophil % 58.7 %      Lymphocyte % 19.8 %      Monocyte % 18.6 %      Eosinophil % 2.2 %      Basophil % 0.7 %      Neutrophils,  "Absolute 4.10 10*3/mm3      Lymphocytes, Absolute 1.40 10*3/mm3      Monocytes, Absolute 1.30 10*3/mm3      Eosinophils, Absolute 0.20 10*3/mm3      Basophils, Absolute 0.00 10*3/mm3      nRBC 0.2 /100 WBC           Imaging Results (Last 48 Hours)     ** No results found for the last 48 hours. **           Physician Progress Notes (last 24 hours)      Kayla Parker APRN at 05/17/22 0842     Attestation signed by Wyatt Lopes MD at 05/17/22 1108    I have reviewed this documentation and agree.  I performed the majority of the physical and decision-making component of this encounter.  Patient with no nausea vomiting overnight and tolerating her diet with mild right-sided periumbilical abdominal pain.  Physical exam  Abdomen-soft, nontender, nondistended  Scleral icterus present and diffuse jaundice noted  Labs-LFTs improving, bilirubin improving  Assessment plan  Elevated LFTs-secondary to hepatitis C and/or hepatitis B.  Viral load of both viruses pending.  LFTs improving and bilirubin dropping.  INR stable.  Okay to be discharged from a GI standpoint.  We will get outpatient labs on Friday to monitor INR and bilirubin.  No plans for antivirals at this point.                   LOS: 4 days   Patient Care Team:  Provider, No Known as PCP - General      Subjective \"Alright I guess\"    Interval History:     Subjective: No nausea or vomiting, tolerating diet.  Mild right sided pain Ultibro bowel movements.      ROS:   No chest pain, shortness of breath, or cough.        Medication Review:     Current Facility-Administered Medications:   •  acetaminophen (TYLENOL) tablet 500 mg, 500 mg, Oral, Q6H PRN, 500 mg at 05/17/22 0840 **OR** acetaminophen (TYLENOL) 160 MG/5ML solution 500 mg, 500 mg, Oral, Q6H PRN **OR** acetaminophen (TYLENOL) suppository 325 mg, 325 mg, Rectal, Q6H PRN, Kris Ramey MD  •  aluminum-magnesium hydroxide-simethicone (MAALOX MAX) 400-400-40 MG/5ML suspension 15 mL, 15 mL, Oral, Q6H PRN, " Kris Ramey MD  •  ipratropium-albuterol (DUO-NEB) nebulizer solution 3 mL, 3 mL, Nebulization, Q6H PRN, Kris Ramey MD  •  lactated ringers infusion, 100 mL/hr, Intravenous, Continuous, Kris Ramey MD, Last Rate: 100 mL/hr at 05/16/22 1630, 100 mL/hr at 05/16/22 1630  •  melatonin tablet 5 mg, 5 mg, Oral, Nightly PRN, Kris Ramey MD, 5 mg at 05/17/22 0220  •  nicotine (NICODERM CQ) 14 MG/24HR patch 1 patch, 1 patch, Transdermal, Q24H, Kris Ramey MD, 1 patch at 05/16/22 2050  •  nitroglycerin (NITROSTAT) SL tablet 0.4 mg, 0.4 mg, Sublingual, Q5 Min PRN, Kris Ramey MD  •  ondansetron (ZOFRAN) tablet 4 mg, 4 mg, Oral, Q6H PRN **OR** ondansetron (ZOFRAN) injection 4 mg, 4 mg, Intravenous, Q6H PRN, Kris Ramey MD  •  oxyCODONE (ROXICODONE) immediate release tablet 5 mg, 5 mg, Oral, Q6H PRN, Jean Pierre Bowman DO, 5 mg at 05/15/22 2233  •  pantoprazole (PROTONIX) EC tablet 40 mg, 40 mg, Oral, Q AM, Krsi Ramey MD, 40 mg at 05/17/22 0514  •  [COMPLETED] Insert peripheral IV, , , Once **AND** sodium chloride 0.9 % flush 10 mL, 10 mL, Intravenous, PRN, Jenna Harvey APRN, 10 mL at 05/13/22 1537  •  sodium chloride 0.9 % flush 10 mL, 10 mL, Intravenous, Q12H, Kris Ramey MD, 10 mL at 05/17/22 0841  •  sodium chloride 0.9 % flush 10 mL, 10 mL, Intravenous, PRN, Kris Ramey MD  •  thiamine (VITAMIN B-1) tablet 100 mg, 100 mg, Oral, Daily, Sonia Stockton APRN, 100 mg at 05/17/22 0840  •  traZODone (DESYREL) tablet 50 mg, 50 mg, Oral, Nightly, Kris Ramey MD, 50 mg at 05/16/22 2046  •  zinc sulfate (ZINCATE) capsule 220 mg, 220 mg, Oral, Daily, Sonia Stockton APRN, 220 mg at 05/17/22 0840      Objective Resting in bed, no acute distress, no family present    Vital Signs  Vitals:    05/16/22 1647 05/16/22 2003 05/17/22 0438 05/17/22 0752   BP: 130/80 154/85 135/83 121/74   BP Location: Left arm Left arm Right arm Left arm   Patient Position: Lying Lying  Lying Lying   Pulse: 71 76 75 70   Resp: 17 16 16 20   Temp: 99 °F (37.2 °C) 98.7 °F (37.1 °C) 98.7 °F (37.1 °C)    TempSrc: Oral Oral Oral    SpO2: 99% 98% 99% 99%   Weight:       Height:           Physical Exam:     General Appearance:    Awake and alert, in no acute distress   Head:    Normocephalic, without obvious abnormality   Eyes:          Conjunctivae normal, icteric sclera   Throat:   No oral lesions, no thrush, oral mucosa moist   Neck:    supple, no JVD   Lungs:     respirations regular, even and unlabored       Rectal:     Deferred   Extremities:   No edema, no cyanosis   Skin:   No bruising or rash, jaundice        Results Review:    CBC    Results from last 7 days   Lab Units 05/17/22  0302 05/16/22  0142 05/15/22  1038 05/14/22  0200 05/13/22  1539   WBC 10*3/mm3 6.90 6.90 4.90 6.10 5.90   HEMOGLOBIN g/dL 11.5* 11.0* 11.5* 11.8* 12.8   PLATELETS 10*3/mm3 254 240 241 272 308     CMP   Results from last 7 days   Lab Units 05/17/22  0302 05/16/22  0142 05/15/22  1038 05/14/22  0200 05/13/22  1539   SODIUM mmol/L 136 138 137 135* 137   POTASSIUM mmol/L 3.7 3.9 3.9 3.9 4.1   CHLORIDE mmol/L 101 105 103 100 102   CO2 mmol/L 26.0 23.0 24.0 23.0 26.0   BUN mg/dL 6 7 9 9 10   CREATININE mg/dL 0.52* 0.69 0.58 0.56* 0.61   GLUCOSE mg/dL 150* 114* 142* 84 99   ALBUMIN g/dL 2.60* 2.70* 2.80* 3.10* 3.40*   BILIRUBIN mg/dL 11.2* 12.0* 13.7* 14.3* 14.6*   ALK PHOS U/L 212* 230* 251* 272* 289*   AST (SGOT) U/L 1,097* 1,513* 1,887* 2,095* 1,901*   ALT (SGPT) U/L 1,697* 1,912* 2,088* 2,097* 1,922*   MAGNESIUM mg/dL  --   --  1.6 1.8 1.7   AMYLASE U/L  --   --   --   --  87   LIPASE U/L  --   --   --   --  29   AMMONIA umol/L  --   --   --   --  18     Cr Clearance Estimated Creatinine Clearance: 102.9 mL/min (A) (by C-G formula based on SCr of 0.52 mg/dL (L)).  Coag   Results from last 7 days   Lab Units 05/17/22  0302 05/16/22  0142 05/15/22  1038 05/14/22  0200 05/13/22  1539   INR  1.58* 1.55* 1.65* 1.51* 1.48*    APTT seconds  --   --   --   --  36.4*     HbA1C No results found for: HGBA1C  Blood Glucose No results found for: POCGLU  Infection     UA    Results from last 7 days   Lab Units 05/13/22  1702   NITRITE UA  Negative   WBC UA /HPF 0-2*   BACTERIA UA /HPF None Seen   SQUAM EPITHEL UA /HPF 0-2     Radiology(recent) No radiology results for the last day       Assessment & Plan   Acute hepatitis   Cholelithiasis  Meth usage/Alcohol usage  Hyponatremia  Mild normocytic anemia  Recent tick bite  GERD     PLAN:  Suspect acute hepatitis B with possible chronic hepatitis C, confirming labs pending.  CMV, tick panel and EBV in progress and unremarkable thus far.  LFTs and INR trending down today.  Full liver serology work-up in progress.  Remains on PPI, thiamine and zinc.  Tolerating diet without difficulty, good nutrition encouraged.  Okay to discharge home from GI standpoint with plan for outpatient lab evaluation on Friday and close follow-up in the office.    Electronically signed by BLAINE Marquez, 05/17/22, 8:42 AM EDT.           Electronically signed by Wyatt Lopes MD at 05/17/22 9478

## 2022-05-17 NOTE — PROGRESS NOTES
" LOS: 4 days   Patient Care Team:  Provider, No Known as PCP - General      Subjective \"Alright I guess\"    Interval History:     Subjective: No nausea or vomiting, tolerating diet.  Mild right sided pain Ultibro bowel movements.      ROS:   No chest pain, shortness of breath, or cough.        Medication Review:     Current Facility-Administered Medications:   •  acetaminophen (TYLENOL) tablet 500 mg, 500 mg, Oral, Q6H PRN, 500 mg at 05/17/22 0840 **OR** acetaminophen (TYLENOL) 160 MG/5ML solution 500 mg, 500 mg, Oral, Q6H PRN **OR** acetaminophen (TYLENOL) suppository 325 mg, 325 mg, Rectal, Q6H PRN, Kris Ramey MD  •  aluminum-magnesium hydroxide-simethicone (MAALOX MAX) 400-400-40 MG/5ML suspension 15 mL, 15 mL, Oral, Q6H PRN, Kris Ramey MD  •  ipratropium-albuterol (DUO-NEB) nebulizer solution 3 mL, 3 mL, Nebulization, Q6H PRN, Kris Ramey MD  •  lactated ringers infusion, 100 mL/hr, Intravenous, Continuous, Kris Ramey MD, Last Rate: 100 mL/hr at 05/16/22 1630, 100 mL/hr at 05/16/22 1630  •  melatonin tablet 5 mg, 5 mg, Oral, Nightly PRN, Kris Ramey MD, 5 mg at 05/17/22 0220  •  nicotine (NICODERM CQ) 14 MG/24HR patch 1 patch, 1 patch, Transdermal, Q24H, Kris Ramey MD, 1 patch at 05/16/22 2050  •  nitroglycerin (NITROSTAT) SL tablet 0.4 mg, 0.4 mg, Sublingual, Q5 Min PRN, Kris Ramey MD  •  ondansetron (ZOFRAN) tablet 4 mg, 4 mg, Oral, Q6H PRN **OR** ondansetron (ZOFRAN) injection 4 mg, 4 mg, Intravenous, Q6H PRN, Kris Ramey MD  •  oxyCODONE (ROXICODONE) immediate release tablet 5 mg, 5 mg, Oral, Q6H PRN, Jean Pierre Bowman DO, 5 mg at 05/15/22 2233  •  pantoprazole (PROTONIX) EC tablet 40 mg, 40 mg, Oral, Q AM, Kris Ramey MD, 40 mg at 05/17/22 0514  •  [COMPLETED] Insert peripheral IV, , , Once **AND** sodium chloride 0.9 % flush 10 mL, 10 mL, Intravenous, PRN, Jenna Harvey APRN, 10 mL at 05/13/22 1537  •  sodium chloride 0.9 % flush 10 mL, 10 mL, " Intravenous, Q12H, Kris Ramey MD, 10 mL at 05/17/22 0841  •  sodium chloride 0.9 % flush 10 mL, 10 mL, Intravenous, PRN, Kris Ramey MD  •  thiamine (VITAMIN B-1) tablet 100 mg, 100 mg, Oral, Daily, Sonia Stockton APRN, 100 mg at 05/17/22 0840  •  traZODone (DESYREL) tablet 50 mg, 50 mg, Oral, Nightly, Kris Ramey MD, 50 mg at 05/16/22 2046  •  zinc sulfate (ZINCATE) capsule 220 mg, 220 mg, Oral, Daily, Sonia Stockton APRN, 220 mg at 05/17/22 0840      Objective Resting in bed, no acute distress, no family present    Vital Signs  Vitals:    05/16/22 1647 05/16/22 2003 05/17/22 0438 05/17/22 0752   BP: 130/80 154/85 135/83 121/74   BP Location: Left arm Left arm Right arm Left arm   Patient Position: Lying Lying Lying Lying   Pulse: 71 76 75 70   Resp: 17 16 16 20   Temp: 99 °F (37.2 °C) 98.7 °F (37.1 °C) 98.7 °F (37.1 °C)    TempSrc: Oral Oral Oral    SpO2: 99% 98% 99% 99%   Weight:       Height:           Physical Exam:     General Appearance:    Awake and alert, in no acute distress   Head:    Normocephalic, without obvious abnormality   Eyes:          Conjunctivae normal, icteric sclera   Throat:   No oral lesions, no thrush, oral mucosa moist   Neck:    supple, no JVD   Lungs:     respirations regular, even and unlabored       Rectal:     Deferred   Extremities:   No edema, no cyanosis   Skin:   No bruising or rash, jaundice        Results Review:    CBC    Results from last 7 days   Lab Units 05/17/22  0302 05/16/22  0142 05/15/22  1038 05/14/22  0200 05/13/22  1539   WBC 10*3/mm3 6.90 6.90 4.90 6.10 5.90   HEMOGLOBIN g/dL 11.5* 11.0* 11.5* 11.8* 12.8   PLATELETS 10*3/mm3 254 240 241 272 308     CMP   Results from last 7 days   Lab Units 05/17/22  0302 05/16/22  0142 05/15/22  1038 05/14/22  0200 05/13/22  1539   SODIUM mmol/L 136 138 137 135* 137   POTASSIUM mmol/L 3.7 3.9 3.9 3.9 4.1   CHLORIDE mmol/L 101 105 103 100 102   CO2 mmol/L 26.0 23.0 24.0 23.0 26.0   BUN mg/dL 6 7 9 9 10    CREATININE mg/dL 0.52* 0.69 0.58 0.56* 0.61   GLUCOSE mg/dL 150* 114* 142* 84 99   ALBUMIN g/dL 2.60* 2.70* 2.80* 3.10* 3.40*   BILIRUBIN mg/dL 11.2* 12.0* 13.7* 14.3* 14.6*   ALK PHOS U/L 212* 230* 251* 272* 289*   AST (SGOT) U/L 1,097* 1,513* 1,887* 2,095* 1,901*   ALT (SGPT) U/L 1,697* 1,912* 2,088* 2,097* 1,922*   MAGNESIUM mg/dL  --   --  1.6 1.8 1.7   AMYLASE U/L  --   --   --   --  87   LIPASE U/L  --   --   --   --  29   AMMONIA umol/L  --   --   --   --  18     Cr Clearance Estimated Creatinine Clearance: 102.9 mL/min (A) (by C-G formula based on SCr of 0.52 mg/dL (L)).  Coag   Results from last 7 days   Lab Units 05/17/22  0302 05/16/22  0142 05/15/22  1038 05/14/22  0200 05/13/22  1539   INR  1.58* 1.55* 1.65* 1.51* 1.48*   APTT seconds  --   --   --   --  36.4*     HbA1C No results found for: HGBA1C  Blood Glucose No results found for: POCGLU  Infection     UA    Results from last 7 days   Lab Units 05/13/22  1702   NITRITE UA  Negative   WBC UA /HPF 0-2*   BACTERIA UA /HPF None Seen   SQUAM EPITHEL UA /HPF 0-2     Radiology(recent) No radiology results for the last day       Assessment & Plan   Acute hepatitis   Cholelithiasis  Meth usage/Alcohol usage  Hyponatremia  Mild normocytic anemia  Recent tick bite  GERD     PLAN:  Suspect acute hepatitis B with possible chronic hepatitis C, confirming labs pending.  CMV, tick panel and EBV in progress and unremarkable thus far.  LFTs and INR trending down today.  Full liver serology work-up in progress.  Remains on PPI, thiamine and zinc.  Tolerating diet without difficulty, good nutrition encouraged.  Okay to discharge home from GI standpoint with plan for outpatient lab evaluation on Friday and close follow-up in the office.    Electronically signed by BLAINE Marquez, 05/17/22, 8:42 AM EDT.

## 2022-05-18 ENCOUNTER — LAB (OUTPATIENT)
Dept: LAB | Facility: HOSPITAL | Age: 57
End: 2022-05-18

## 2022-05-18 ENCOUNTER — TRANSCRIBE ORDERS (OUTPATIENT)
Dept: ADMINISTRATIVE | Facility: HOSPITAL | Age: 57
End: 2022-05-18

## 2022-05-18 DIAGNOSIS — B17.10 ACUTE HEPATITIS C WITHOUT HEPATIC COMA: ICD-10-CM

## 2022-05-18 DIAGNOSIS — R74.8 ACID PHOSPHATASE ELEVATED: ICD-10-CM

## 2022-05-18 DIAGNOSIS — Z20.5 EXPOSURE TO HEPATITIS B: ICD-10-CM

## 2022-05-18 DIAGNOSIS — R74.8 ACID PHOSPHATASE ELEVATED: Primary | ICD-10-CM

## 2022-05-18 LAB
ALBUMIN SERPL-MCNC: 3.4 G/DL (ref 3.5–5.2)
ALBUMIN/GLOB SERPL: 0.8 G/DL
ALP SERPL-CCNC: 221 U/L (ref 39–117)
ALT SERPL W P-5'-P-CCNC: 1265 U/L (ref 1–33)
ANION GAP SERPL CALCULATED.3IONS-SCNC: 7.4 MMOL/L (ref 5–15)
AST SERPL-CCNC: 549 U/L (ref 1–32)
BASOPHILS # BLD AUTO: 0.04 10*3/MM3 (ref 0–0.2)
BASOPHILS NFR BLD AUTO: 0.5 % (ref 0–1.5)
BILIRUB SERPL-MCNC: 14.5 MG/DL (ref 0–1.2)
BUN SERPL-MCNC: 7 MG/DL (ref 6–20)
BUN/CREAT SERPL: 10.8 (ref 7–25)
CALCIUM SPEC-SCNC: 9.7 MG/DL (ref 8.6–10.5)
CHLORIDE SERPL-SCNC: 101 MMOL/L (ref 98–107)
CMV IGG SERPL IA-ACNC: >10 U/ML (ref 0–0.59)
CMV IGM SERPL IA-ACNC: 39.2 AU/ML (ref 0–29.9)
CO2 SERPL-SCNC: 28.6 MMOL/L (ref 22–29)
CREAT SERPL-MCNC: 0.65 MG/DL (ref 0.57–1)
DEPRECATED RDW RBC AUTO: 51 FL (ref 37–54)
EGFRCR SERPLBLD CKD-EPI 2021: 102.2 ML/MIN/1.73
EOSINOPHIL # BLD AUTO: 0.09 10*3/MM3 (ref 0–0.4)
EOSINOPHIL NFR BLD AUTO: 1.2 % (ref 0.3–6.2)
ERYTHROCYTE [DISTWIDTH] IN BLOOD BY AUTOMATED COUNT: 15.8 % (ref 12.3–15.4)
GLOBULIN UR ELPH-MCNC: 4.1 GM/DL
GLUCOSE SERPL-MCNC: 83 MG/DL (ref 65–99)
HBV SURFACE AG SERPL QL IA: NORMAL
HBV SURFACE AG SERPL QL NT: POSITIVE
HCT VFR BLD AUTO: 36.9 % (ref 34–46.6)
HGB BLD-MCNC: 12.4 G/DL (ref 12–15.9)
IMM GRANULOCYTES # BLD AUTO: 0.04 10*3/MM3 (ref 0–0.05)
IMM GRANULOCYTES NFR BLD AUTO: 0.5 % (ref 0–0.5)
INR PPP: 1.35 (ref 0.93–1.1)
LYMPHOCYTES # BLD AUTO: 2.02 10*3/MM3 (ref 0.7–3.1)
LYMPHOCYTES NFR BLD AUTO: 26.2 % (ref 19.6–45.3)
MCH RBC QN AUTO: 30.2 PG (ref 26.6–33)
MCHC RBC AUTO-ENTMCNC: 33.6 G/DL (ref 31.5–35.7)
MCV RBC AUTO: 90 FL (ref 79–97)
MONOCYTES # BLD AUTO: 1.17 10*3/MM3 (ref 0.1–0.9)
MONOCYTES NFR BLD AUTO: 15.2 % (ref 5–12)
NEUTROPHILS NFR BLD AUTO: 4.35 10*3/MM3 (ref 1.7–7)
NEUTROPHILS NFR BLD AUTO: 56.4 % (ref 42.7–76)
NRBC BLD AUTO-RTO: 0 /100 WBC (ref 0–0.2)
PLATELET # BLD AUTO: 373 10*3/MM3 (ref 140–450)
PMV BLD AUTO: 12.7 FL (ref 6–12)
POTASSIUM SERPL-SCNC: 4.1 MMOL/L (ref 3.5–5.2)
PROT SERPL-MCNC: 7.5 G/DL (ref 6–8.5)
PROTHROMBIN TIME: 13.7 SECONDS (ref 9.6–11.7)
RBC # BLD AUTO: 4.1 10*6/MM3 (ref 3.77–5.28)
SODIUM SERPL-SCNC: 137 MMOL/L (ref 136–145)
WBC NRBC COR # BLD: 7.71 10*3/MM3 (ref 3.4–10.8)

## 2022-05-18 PROCEDURE — 85610 PROTHROMBIN TIME: CPT

## 2022-05-18 PROCEDURE — 80053 COMPREHEN METABOLIC PANEL: CPT

## 2022-05-18 PROCEDURE — 85025 COMPLETE CBC W/AUTO DIFF WBC: CPT

## 2022-05-18 PROCEDURE — 36415 COLL VENOUS BLD VENIPUNCTURE: CPT

## 2022-05-24 ENCOUNTER — LAB (OUTPATIENT)
Dept: LAB | Facility: HOSPITAL | Age: 57
End: 2022-05-24

## 2022-05-24 ENCOUNTER — TRANSCRIBE ORDERS (OUTPATIENT)
Dept: ADMINISTRATIVE | Facility: HOSPITAL | Age: 57
End: 2022-05-24

## 2022-05-24 DIAGNOSIS — B17.0: ICD-10-CM

## 2022-05-24 DIAGNOSIS — Z20.5 EXPOSURE TO HEPATITIS B: ICD-10-CM

## 2022-05-24 DIAGNOSIS — R74.8 ACID PHOSPHATASE ELEVATED: Primary | ICD-10-CM

## 2022-05-24 DIAGNOSIS — R74.8 ACID PHOSPHATASE ELEVATED: ICD-10-CM

## 2022-05-24 LAB
ALBUMIN SERPL-MCNC: 3.3 G/DL (ref 3.5–5.2)
ALBUMIN/GLOB SERPL: 0.8 G/DL
ALP SERPL-CCNC: 133 U/L (ref 39–117)
ALT SERPL W P-5'-P-CCNC: 208 U/L (ref 1–33)
ANION GAP SERPL CALCULATED.3IONS-SCNC: 11.3 MMOL/L (ref 5–15)
AST SERPL-CCNC: 68 U/L (ref 1–32)
BASOPHILS # BLD AUTO: 0.04 10*3/MM3 (ref 0–0.2)
BASOPHILS NFR BLD AUTO: 0.5 % (ref 0–1.5)
BILIRUB SERPL-MCNC: 8.5 MG/DL (ref 0–1.2)
BUN SERPL-MCNC: 10 MG/DL (ref 6–20)
BUN/CREAT SERPL: 16.9 (ref 7–25)
CALCIUM SPEC-SCNC: 9.2 MG/DL (ref 8.6–10.5)
CHLORIDE SERPL-SCNC: 103 MMOL/L (ref 98–107)
CO2 SERPL-SCNC: 23.7 MMOL/L (ref 22–29)
CREAT SERPL-MCNC: 0.59 MG/DL (ref 0.57–1)
DEPRECATED RDW RBC AUTO: 51 FL (ref 37–54)
EGFRCR SERPLBLD CKD-EPI 2021: 104.6 ML/MIN/1.73
EOSINOPHIL # BLD AUTO: 0.09 10*3/MM3 (ref 0–0.4)
EOSINOPHIL NFR BLD AUTO: 1.1 % (ref 0.3–6.2)
ERYTHROCYTE [DISTWIDTH] IN BLOOD BY AUTOMATED COUNT: 15 % (ref 12.3–15.4)
GLOBULIN UR ELPH-MCNC: 4.2 GM/DL
GLUCOSE SERPL-MCNC: 137 MG/DL (ref 65–99)
HCT VFR BLD AUTO: 32.1 % (ref 34–46.6)
HGB BLD-MCNC: 10.6 G/DL (ref 12–15.9)
IMM GRANULOCYTES # BLD AUTO: 0.03 10*3/MM3 (ref 0–0.05)
IMM GRANULOCYTES NFR BLD AUTO: 0.4 % (ref 0–0.5)
INR PPP: 1.2 (ref 0.93–1.1)
LYMPHOCYTES # BLD AUTO: 1.52 10*3/MM3 (ref 0.7–3.1)
LYMPHOCYTES NFR BLD AUTO: 19 % (ref 19.6–45.3)
MCH RBC QN AUTO: 31.1 PG (ref 26.6–33)
MCHC RBC AUTO-ENTMCNC: 33 G/DL (ref 31.5–35.7)
MCV RBC AUTO: 94.1 FL (ref 79–97)
MONOCYTES # BLD AUTO: 1.04 10*3/MM3 (ref 0.1–0.9)
MONOCYTES NFR BLD AUTO: 13 % (ref 5–12)
NEUTROPHILS NFR BLD AUTO: 5.29 10*3/MM3 (ref 1.7–7)
NEUTROPHILS NFR BLD AUTO: 66 % (ref 42.7–76)
NRBC BLD AUTO-RTO: 0 /100 WBC (ref 0–0.2)
PLATELET # BLD AUTO: 376 10*3/MM3 (ref 140–450)
PMV BLD AUTO: 12.5 FL (ref 6–12)
POTASSIUM SERPL-SCNC: 3.5 MMOL/L (ref 3.5–5.2)
PROT SERPL-MCNC: 7.5 G/DL (ref 6–8.5)
PROTHROMBIN TIME: 12.2 SECONDS (ref 9.6–11.7)
RBC # BLD AUTO: 3.41 10*6/MM3 (ref 3.77–5.28)
SODIUM SERPL-SCNC: 138 MMOL/L (ref 136–145)
WBC NRBC COR # BLD: 8.01 10*3/MM3 (ref 3.4–10.8)

## 2022-05-24 PROCEDURE — 85025 COMPLETE CBC W/AUTO DIFF WBC: CPT

## 2022-05-24 PROCEDURE — 85610 PROTHROMBIN TIME: CPT

## 2022-05-24 PROCEDURE — 80053 COMPREHEN METABOLIC PANEL: CPT

## 2022-05-24 PROCEDURE — 36415 COLL VENOUS BLD VENIPUNCTURE: CPT

## 2022-05-31 ENCOUNTER — LAB (OUTPATIENT)
Dept: LAB | Facility: HOSPITAL | Age: 57
End: 2022-05-31

## 2022-05-31 ENCOUNTER — TRANSCRIBE ORDERS (OUTPATIENT)
Dept: ADMINISTRATIVE | Facility: HOSPITAL | Age: 57
End: 2022-05-31

## 2022-05-31 DIAGNOSIS — B17.10 ACUTE HEPATITIS C WITHOUT HEPATIC COMA: ICD-10-CM

## 2022-05-31 DIAGNOSIS — Z20.5 EXPOSURE TO HEPATITIS B: ICD-10-CM

## 2022-05-31 DIAGNOSIS — R74.8 ELEVATED LIVER ENZYMES: Primary | ICD-10-CM

## 2022-05-31 DIAGNOSIS — R74.8 ELEVATED LIVER ENZYMES: ICD-10-CM

## 2022-05-31 LAB
ALBUMIN SERPL-MCNC: 4.1 G/DL (ref 3.5–5.2)
ALBUMIN/GLOB SERPL: 1.1 G/DL
ALP SERPL-CCNC: 131 U/L (ref 39–117)
ALT SERPL W P-5'-P-CCNC: 69 U/L (ref 1–33)
ANION GAP SERPL CALCULATED.3IONS-SCNC: 12.6 MMOL/L (ref 5–15)
AST SERPL-CCNC: 55 U/L (ref 1–32)
BASOPHILS # BLD AUTO: 0.03 10*3/MM3 (ref 0–0.2)
BASOPHILS NFR BLD AUTO: 0.4 % (ref 0–1.5)
BILIRUB SERPL-MCNC: 5.3 MG/DL (ref 0–1.2)
BUN SERPL-MCNC: 13 MG/DL (ref 6–20)
BUN/CREAT SERPL: 22.4 (ref 7–25)
CALCIUM SPEC-SCNC: 9.7 MG/DL (ref 8.6–10.5)
CHLORIDE SERPL-SCNC: 96 MMOL/L (ref 98–107)
CO2 SERPL-SCNC: 26.4 MMOL/L (ref 22–29)
CREAT SERPL-MCNC: 0.58 MG/DL (ref 0.57–1)
DEPRECATED RDW RBC AUTO: 52.7 FL (ref 37–54)
EGFRCR SERPLBLD CKD-EPI 2021: 105 ML/MIN/1.73
EOSINOPHIL # BLD AUTO: 0.15 10*3/MM3 (ref 0–0.4)
EOSINOPHIL NFR BLD AUTO: 2 % (ref 0.3–6.2)
ERYTHROCYTE [DISTWIDTH] IN BLOOD BY AUTOMATED COUNT: 14.9 % (ref 12.3–15.4)
GLOBULIN UR ELPH-MCNC: 3.8 GM/DL
GLUCOSE SERPL-MCNC: 76 MG/DL (ref 65–99)
HCT VFR BLD AUTO: 34.1 % (ref 34–46.6)
HGB BLD-MCNC: 11.3 G/DL (ref 12–15.9)
IMM GRANULOCYTES # BLD AUTO: 0.02 10*3/MM3 (ref 0–0.05)
IMM GRANULOCYTES NFR BLD AUTO: 0.3 % (ref 0–0.5)
INR PPP: 1.24 (ref 0.93–1.1)
LYMPHOCYTES # BLD AUTO: 1.75 10*3/MM3 (ref 0.7–3.1)
LYMPHOCYTES NFR BLD AUTO: 23.6 % (ref 19.6–45.3)
MCH RBC QN AUTO: 31.9 PG (ref 26.6–33)
MCHC RBC AUTO-ENTMCNC: 33.1 G/DL (ref 31.5–35.7)
MCV RBC AUTO: 96.3 FL (ref 79–97)
MONOCYTES # BLD AUTO: 1.24 10*3/MM3 (ref 0.1–0.9)
MONOCYTES NFR BLD AUTO: 16.7 % (ref 5–12)
NEUTROPHILS NFR BLD AUTO: 4.22 10*3/MM3 (ref 1.7–7)
NEUTROPHILS NFR BLD AUTO: 57 % (ref 42.7–76)
NRBC BLD AUTO-RTO: 0 /100 WBC (ref 0–0.2)
PLATELET # BLD AUTO: 488 10*3/MM3 (ref 140–450)
PMV BLD AUTO: 11.6 FL (ref 6–12)
POTASSIUM SERPL-SCNC: 4.8 MMOL/L (ref 3.5–5.2)
PROT SERPL-MCNC: 7.9 G/DL (ref 6–8.5)
PROTHROMBIN TIME: 12.6 SECONDS (ref 9.6–11.7)
RBC # BLD AUTO: 3.54 10*6/MM3 (ref 3.77–5.28)
SODIUM SERPL-SCNC: 135 MMOL/L (ref 136–145)
WBC NRBC COR # BLD: 7.41 10*3/MM3 (ref 3.4–10.8)

## 2022-05-31 PROCEDURE — 85025 COMPLETE CBC W/AUTO DIFF WBC: CPT

## 2022-05-31 PROCEDURE — 80053 COMPREHEN METABOLIC PANEL: CPT

## 2022-05-31 PROCEDURE — 36415 COLL VENOUS BLD VENIPUNCTURE: CPT

## 2022-05-31 PROCEDURE — 85610 PROTHROMBIN TIME: CPT

## 2022-06-02 ENCOUNTER — OFFICE (OUTPATIENT)
Dept: URBAN - METROPOLITAN AREA CLINIC 64 | Facility: CLINIC | Age: 57
End: 2022-06-02
Payer: COMMERCIAL

## 2022-06-02 VITALS
HEIGHT: 62 IN | WEIGHT: 134 LBS | HEART RATE: 80 BPM | DIASTOLIC BLOOD PRESSURE: 81 MMHG | SYSTOLIC BLOOD PRESSURE: 122 MMHG

## 2022-06-02 DIAGNOSIS — R94.5 ABNORMAL RESULTS OF LIVER FUNCTION STUDIES: ICD-10-CM

## 2022-06-02 DIAGNOSIS — B16.9 ACUTE HEPATITIS B WITHOUT DELTA-AGENT AND WITHOUT HEPATIC CO: ICD-10-CM

## 2022-06-02 PROCEDURE — 99204 OFFICE O/P NEW MOD 45 MIN: CPT | Performed by: INTERNAL MEDICINE

## 2022-06-02 PROCEDURE — 99214 OFFICE O/P EST MOD 30 MIN: CPT | Performed by: INTERNAL MEDICINE

## 2025-08-12 ENCOUNTER — APPOINTMENT (OUTPATIENT)
Dept: GENERAL RADIOLOGY | Facility: HOSPITAL | Age: 60
End: 2025-08-12
Payer: COMMERCIAL

## 2025-08-12 ENCOUNTER — HOSPITAL ENCOUNTER (EMERGENCY)
Facility: HOSPITAL | Age: 60
Discharge: HOME | End: 2025-08-12
Attending: EMERGENCY MEDICINE | Admitting: EMERGENCY MEDICINE
Payer: COMMERCIAL

## 2025-08-12 VITALS
TEMPERATURE: 97.9 F | OXYGEN SATURATION: 98 % | WEIGHT: 160 LBS | BODY MASS INDEX: 29.44 KG/M2 | SYSTOLIC BLOOD PRESSURE: 150 MMHG | HEIGHT: 62 IN | DIASTOLIC BLOOD PRESSURE: 95 MMHG | RESPIRATION RATE: 20 BRPM | HEART RATE: 84 BPM

## 2025-08-12 DIAGNOSIS — S69.92XA INJURY OF FINGER OF LEFT HAND, INITIAL ENCOUNTER: Primary | ICD-10-CM

## 2025-08-12 PROCEDURE — 99283 EMERGENCY DEPT VISIT LOW MDM: CPT | Performed by: EMERGENCY MEDICINE

## 2025-08-12 PROCEDURE — 99282 EMERGENCY DEPT VISIT SF MDM: CPT

## 2025-08-12 PROCEDURE — 90715 TDAP VACCINE 7 YRS/> IM: CPT

## 2025-08-12 PROCEDURE — 25010000002 TETANUS-DIPHTH-ACELL PERTUSSIS 5-2.5-18.5 LF-MCG/0.5 SUSPENSION PREFILLED SYRINGE

## 2025-08-12 PROCEDURE — 90471 IMMUNIZATION ADMIN: CPT

## 2025-08-12 PROCEDURE — 73140 X-RAY EXAM OF FINGER(S): CPT

## 2025-08-12 RX ADMIN — TETANUS TOXOID, REDUCED DIPHTHERIA TOXOID AND ACELLULAR PERTUSSIS VACCINE, ADSORBED 0.5 ML: 5; 2.5; 8; 8; 2.5 SUSPENSION INTRAMUSCULAR at 22:55
